# Patient Record
Sex: FEMALE | Race: BLACK OR AFRICAN AMERICAN | NOT HISPANIC OR LATINO | Employment: OTHER | URBAN - METROPOLITAN AREA
[De-identification: names, ages, dates, MRNs, and addresses within clinical notes are randomized per-mention and may not be internally consistent; named-entity substitution may affect disease eponyms.]

---

## 2017-05-16 ENCOUNTER — HISTORICAL (OUTPATIENT)
Dept: ADMINISTRATIVE | Facility: HOSPITAL | Age: 62
End: 2017-05-16

## 2020-02-04 ENCOUNTER — HISTORICAL (OUTPATIENT)
Dept: ADMINISTRATIVE | Facility: HOSPITAL | Age: 65
End: 2020-02-04

## 2020-04-14 ENCOUNTER — HISTORICAL (OUTPATIENT)
Dept: ADMINISTRATIVE | Facility: HOSPITAL | Age: 65
End: 2020-04-14

## 2022-04-11 ENCOUNTER — HISTORICAL (OUTPATIENT)
Dept: ADMINISTRATIVE | Facility: HOSPITAL | Age: 67
End: 2022-04-11

## 2022-04-29 VITALS
HEIGHT: 62 IN | SYSTOLIC BLOOD PRESSURE: 137 MMHG | DIASTOLIC BLOOD PRESSURE: 65 MMHG | BODY MASS INDEX: 33.86 KG/M2 | WEIGHT: 184 LBS

## 2022-06-23 ENCOUNTER — HOSPITAL ENCOUNTER (INPATIENT)
Facility: HOSPITAL | Age: 67
LOS: 2 days | Discharge: HOME | DRG: 689 | End: 2022-06-26
Attending: STUDENT IN AN ORGANIZED HEALTH CARE EDUCATION/TRAINING PROGRAM | Admitting: INTERNAL MEDICINE
Payer: MEDICARE

## 2022-06-23 DIAGNOSIS — R00.1 BRADYCARDIA: ICD-10-CM

## 2022-06-23 DIAGNOSIS — R41.0 CONFUSION: ICD-10-CM

## 2022-06-23 DIAGNOSIS — R44.1 VISUAL HALLUCINATION: ICD-10-CM

## 2022-06-23 DIAGNOSIS — N17.9 AKI (ACUTE KIDNEY INJURY): ICD-10-CM
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
## 2022-06-23 DIAGNOSIS — N30.00 ACUTE CYSTITIS WITHOUT HEMATURIA: Primary | ICD-10-CM

## 2022-06-23 LAB
ALBUMIN SERPL-MCNC: 3.1 GM/DL (ref 3.4–4.8)
ALBUMIN/GLOB SERPL: 0.7 RATIO (ref 1.1–2)
ALP SERPL-CCNC: 47 UNIT/L (ref 40–150)
ALT SERPL-CCNC: 10 UNIT/L (ref 0–55)
APPEARANCE UR: ABNORMAL
AST SERPL-CCNC: 17 UNIT/L (ref 5–34)
BACTERIA #/AREA URNS AUTO: ABNORMAL /HPF
BASOPHILS # BLD AUTO: 0.1 X10(3)/MCL (ref 0–0.2)
BASOPHILS NFR BLD AUTO: 0.8 %
BILIRUB UR QL STRIP.AUTO: NEGATIVE MG/DL
BILIRUBIN DIRECT+TOT PNL SERPL-MCNC: 0.2 MG/DL
BUN SERPL-MCNC: 23.9 MG/DL (ref 9.8–20.1)
CALCIUM SERPL-MCNC: 9.8 MG/DL (ref 8.4–10.2)
CHLORIDE SERPL-SCNC: 99 MMOL/L (ref 98–107)
CO2 SERPL-SCNC: 29 MMOL/L (ref 23–31)
COLOR UR AUTO: YELLOW
CREAT SERPL-MCNC: 1.69 MG/DL (ref 0.55–1.02)
EOSINOPHIL # BLD AUTO: 0.23 X10(3)/MCL (ref 0–0.9)
EOSINOPHIL NFR BLD AUTO: 1.8 %
ERYTHROCYTE [DISTWIDTH] IN BLOOD BY AUTOMATED COUNT: 16.6 % (ref 11.5–17)
ETHANOL SERPL-MCNC: <10 MG/DL
GLOBULIN SER-MCNC: 4.7 GM/DL (ref 2.4–3.5)
GLUCOSE SERPL-MCNC: 84 MG/DL (ref 82–115)
GLUCOSE UR QL STRIP.AUTO: NEGATIVE MG/DL
HCT VFR BLD AUTO: 28.9 % (ref 37–47)
HGB BLD-MCNC: 9.5 GM/DL (ref 12–16)
HYALINE CASTS URNS QL MICRO: ABNORMAL /HPF
IMM GRANULOCYTES # BLD AUTO: 0.13 X10(3)/MCL (ref 0–0.02)
IMM GRANULOCYTES NFR BLD AUTO: 1 % (ref 0–0.43)
KETONES UR QL STRIP.AUTO: NEGATIVE MG/DL
LACTATE SERPL-SCNC: 0.9 MMOL/L (ref 0.5–2.2)
LEUKOCYTE ESTERASE UR QL STRIP.AUTO: ABNORMAL UNIT/L
LYMPHOCYTES # BLD AUTO: 1.46 X10(3)/MCL (ref 0.6–4.6)
LYMPHOCYTES NFR BLD AUTO: 11.6 %
MCH RBC QN AUTO: 29 PG (ref 27–31)
MCHC RBC AUTO-ENTMCNC: 32.9 MG/DL (ref 33–36)
MCV RBC AUTO: 88.1 FL (ref 80–94)
MONOCYTES # BLD AUTO: 0.86 X10(3)/MCL (ref 0.1–1.3)
MONOCYTES NFR BLD AUTO: 6.8 %
MUCOUS THREADS URNS QL MICRO: ABNORMAL /LPF
NEUTROPHILS # BLD AUTO: 9.8 X10(3)/MCL (ref 2.1–9.2)
NEUTROPHILS NFR BLD AUTO: 78 %
NITRITE UR QL STRIP.AUTO: NEGATIVE
NRBC BLD AUTO-RTO: 0 %
PH UR STRIP.AUTO: 5.5 [PH]
PLATELET # BLD AUTO: 350 X10(3)/MCL (ref 130–400)
PMV BLD AUTO: 12.2 FL (ref 9.4–12.4)
POTASSIUM SERPL-SCNC: 3.2 MMOL/L (ref 3.5–5.1)
PROT SERPL-MCNC: 7.8 GM/DL (ref 5.8–7.6)
PROT UR QL STRIP.AUTO: NEGATIVE MG/DL
RBC # BLD AUTO: 3.28 X10(6)/MCL (ref 4.2–5.4)
RBC #/AREA URNS AUTO: ABNORMAL /HPF
RBC UR QL AUTO: NEGATIVE UNIT/L
SODIUM SERPL-SCNC: 143 MMOL/L (ref 136–145)
SP GR UR STRIP.AUTO: 1.02
SQUAMOUS #/AREA URNS AUTO: ABNORMAL /HPF
TROPONIN I SERPL-MCNC: <0.01 NG/ML (ref 0–0.04)
UROBILINOGEN UR STRIP-ACNC: 0.2 MG/DL
WBC # SPEC AUTO: 12.6 X10(3)/MCL (ref 4.5–11.5)
WBC #/AREA URNS AUTO: ABNORMAL /HPF

## 2022-06-23 PROCEDURE — 99285 EMERGENCY DEPT VISIT HI MDM: CPT | Mod: 25

## 2022-06-23 PROCEDURE — 83605 ASSAY OF LACTIC ACID: CPT | Performed by: STUDENT IN AN ORGANIZED HEALTH CARE EDUCATION/TRAINING PROGRAM

## 2022-06-23 PROCEDURE — 87040 BLOOD CULTURE FOR BACTERIA: CPT | Performed by: STUDENT IN AN ORGANIZED HEALTH CARE EDUCATION/TRAINING PROGRAM

## 2022-06-23 PROCEDURE — 93005 ELECTROCARDIOGRAM TRACING: CPT

## 2022-06-23 PROCEDURE — 82077 ASSAY SPEC XCP UR&BREATH IA: CPT | Performed by: STUDENT IN AN ORGANIZED HEALTH CARE EDUCATION/TRAINING PROGRAM

## 2022-06-23 PROCEDURE — 81001 URINALYSIS AUTO W/SCOPE: CPT | Performed by: STUDENT IN AN ORGANIZED HEALTH CARE EDUCATION/TRAINING PROGRAM

## 2022-06-23 PROCEDURE — 93010 ELECTROCARDIOGRAM REPORT: CPT | Mod: ,,, | Performed by: INTERNAL MEDICINE

## 2022-06-23 PROCEDURE — 36415 COLL VENOUS BLD VENIPUNCTURE: CPT | Performed by: STUDENT IN AN ORGANIZED HEALTH CARE EDUCATION/TRAINING PROGRAM

## 2022-06-23 PROCEDURE — 80307 DRUG TEST PRSMV CHEM ANLYZR: CPT | Performed by: STUDENT IN AN ORGANIZED HEALTH CARE EDUCATION/TRAINING PROGRAM

## 2022-06-23 PROCEDURE — 80162 ASSAY OF DIGOXIN TOTAL: CPT | Performed by: STUDENT IN AN ORGANIZED HEALTH CARE EDUCATION/TRAINING PROGRAM

## 2022-06-23 PROCEDURE — 80053 COMPREHEN METABOLIC PANEL: CPT | Performed by: STUDENT IN AN ORGANIZED HEALTH CARE EDUCATION/TRAINING PROGRAM

## 2022-06-23 PROCEDURE — 84443 ASSAY THYROID STIM HORMONE: CPT | Performed by: STUDENT IN AN ORGANIZED HEALTH CARE EDUCATION/TRAINING PROGRAM

## 2022-06-23 PROCEDURE — 87636 SARSCOV2 & INF A&B AMP PRB: CPT | Performed by: STUDENT IN AN ORGANIZED HEALTH CARE EDUCATION/TRAINING PROGRAM

## 2022-06-23 PROCEDURE — 96360 HYDRATION IV INFUSION INIT: CPT

## 2022-06-23 PROCEDURE — 85025 COMPLETE CBC W/AUTO DIFF WBC: CPT | Performed by: STUDENT IN AN ORGANIZED HEALTH CARE EDUCATION/TRAINING PROGRAM

## 2022-06-23 PROCEDURE — 93010 EKG 12-LEAD: ICD-10-PCS | Mod: ,,, | Performed by: INTERNAL MEDICINE

## 2022-06-23 PROCEDURE — 84484 ASSAY OF TROPONIN QUANT: CPT | Performed by: STUDENT IN AN ORGANIZED HEALTH CARE EDUCATION/TRAINING PROGRAM

## 2022-06-23 RX ORDER — LISINOPRIL 20 MG/1
TABLET ORAL
COMMUNITY
Start: 2022-04-01

## 2022-06-23 RX ORDER — POTASSIUM CHLORIDE 20 MEQ/15ML
SOLUTION ORAL
COMMUNITY
Start: 2022-04-01

## 2022-06-23 RX ORDER — HYDROCHLOROTHIAZIDE 25 MG/1
TABLET ORAL
Status: ON HOLD | COMMUNITY
Start: 2022-04-01 | End: 2022-07-03 | Stop reason: HOSPADM

## 2022-06-23 RX ORDER — POTASSIUM CHLORIDE 20 MEQ/1
40 TABLET, EXTENDED RELEASE ORAL
Status: COMPLETED | OUTPATIENT
Start: 2022-06-24 | End: 2022-06-24

## 2022-06-23 RX ORDER — PANTOPRAZOLE SODIUM 40 MG/1
TABLET, DELAYED RELEASE ORAL
COMMUNITY
Start: 2022-04-01

## 2022-06-23 RX ORDER — ALBUTEROL SULFATE 90 UG/1
AEROSOL, METERED RESPIRATORY (INHALATION)
COMMUNITY

## 2022-06-23 RX ORDER — LEVOTHYROXINE SODIUM 100 UG/1
100 TABLET ORAL DAILY
COMMUNITY
Start: 2022-05-27

## 2022-06-23 RX ORDER — ATORVASTATIN CALCIUM 20 MG/1
TABLET, FILM COATED ORAL
COMMUNITY

## 2022-06-23 RX ORDER — PREDNISONE 20 MG/1
TABLET ORAL
Status: ON HOLD | COMMUNITY
Start: 2022-06-06 | End: 2022-07-03 | Stop reason: HOSPADM

## 2022-06-23 RX ORDER — ISOSORBIDE MONONITRATE 30 MG/1
TABLET, EXTENDED RELEASE ORAL
COMMUNITY
Start: 2022-04-01

## 2022-06-23 RX ORDER — TORSEMIDE 20 MG/1
20 TABLET ORAL DAILY
COMMUNITY
Start: 2022-06-08

## 2022-06-23 RX ORDER — ASPIRIN 325 MG
TABLET ORAL
COMMUNITY

## 2022-06-23 RX ORDER — BOSENTAN 125 MG/1
TABLET, FILM COATED ORAL
COMMUNITY
Start: 2022-04-01

## 2022-06-23 RX ORDER — FERROUS GLUCONATE 324(38)MG
TABLET ORAL
COMMUNITY
Start: 2022-04-01

## 2022-06-23 RX ORDER — FLUTICASONE PROPIONATE 50 MCG
SPRAY, SUSPENSION (ML) NASAL
COMMUNITY
Start: 2022-04-01

## 2022-06-23 RX ORDER — DIGOXIN 250 MCG
TABLET ORAL
COMMUNITY
Start: 2022-04-01

## 2022-06-23 RX ORDER — CHOLESTYRAMINE 4 G/9G
POWDER, FOR SUSPENSION ORAL
COMMUNITY
Start: 2022-04-06

## 2022-06-23 RX ORDER — METOPROLOL SUCCINATE 25 MG/1
TABLET, EXTENDED RELEASE ORAL
Status: ON HOLD | COMMUNITY
End: 2022-07-03 | Stop reason: HOSPADM

## 2022-06-24 PROBLEM — I10 HTN (HYPERTENSION): Status: ACTIVE | Noted: 2022-06-24

## 2022-06-24 PROBLEM — N39.0 UTI (URINARY TRACT INFECTION): Status: ACTIVE | Noted: 2022-06-24

## 2022-06-24 PROBLEM — R44.3 HALLUCINATION: Status: ACTIVE | Noted: 2022-06-24

## 2022-06-24 PROBLEM — E78.5 HLD (HYPERLIPIDEMIA): Status: ACTIVE | Noted: 2022-06-24

## 2022-06-24 LAB
ALBUMIN SERPL-MCNC: 2.6 GM/DL (ref 3.4–4.8)
ALBUMIN/GLOB SERPL: 0.7 RATIO (ref 1.1–2)
ALP SERPL-CCNC: 42 UNIT/L (ref 40–150)
ALT SERPL-CCNC: 8 UNIT/L (ref 0–55)
AMPHET UR QL SCN: NEGATIVE
AST SERPL-CCNC: 15 UNIT/L (ref 5–34)
BARBITURATE SCN PRESENT UR: NEGATIVE
BASOPHILS # BLD AUTO: 0.08 X10(3)/MCL (ref 0–0.2)
BASOPHILS NFR BLD AUTO: 0.9 %
BENZODIAZ UR QL SCN: NEGATIVE
BILIRUBIN DIRECT+TOT PNL SERPL-MCNC: 0.2 MG/DL
BUN SERPL-MCNC: 21.1 MG/DL (ref 9.8–20.1)
CALCIUM SERPL-MCNC: 9.3 MG/DL (ref 8.4–10.2)
CANNABINOIDS UR QL SCN: NEGATIVE
CHLORIDE SERPL-SCNC: 104 MMOL/L (ref 98–107)
CO2 SERPL-SCNC: 30 MMOL/L (ref 23–31)
COCAINE UR QL SCN: NEGATIVE
CREAT SERPL-MCNC: 1.37 MG/DL (ref 0.55–1.02)
DIGOXIN SERPL-MCNC: 0.7 NG/ML (ref 0.8–2)
EOSINOPHIL # BLD AUTO: 0.24 X10(3)/MCL (ref 0–0.9)
EOSINOPHIL NFR BLD AUTO: 2.7 %
ERYTHROCYTE [DISTWIDTH] IN BLOOD BY AUTOMATED COUNT: 16.5 % (ref 11.5–17)
FLUAV AG UPPER RESP QL IA.RAPID: NOT DETECTED
FLUBV AG UPPER RESP QL IA.RAPID: NOT DETECTED
GLOBULIN SER-MCNC: 3.5 GM/DL (ref 2.4–3.5)
GLUCOSE SERPL-MCNC: 70 MG/DL (ref 82–115)
HCT VFR BLD AUTO: 24.8 % (ref 37–47)
HGB BLD-MCNC: 8.1 GM/DL (ref 12–16)
IMM GRANULOCYTES # BLD AUTO: 0.07 X10(3)/MCL (ref 0–0.02)
IMM GRANULOCYTES NFR BLD AUTO: 0.8 % (ref 0–0.43)
LYMPHOCYTES # BLD AUTO: 1.31 X10(3)/MCL (ref 0.6–4.6)
LYMPHOCYTES NFR BLD AUTO: 14.8 %
MCH RBC QN AUTO: 28.6 PG (ref 27–31)
MCHC RBC AUTO-ENTMCNC: 32.7 MG/DL (ref 33–36)
MCV RBC AUTO: 87.6 FL (ref 80–94)
MDMA UR QL SCN: NEGATIVE
MONOCYTES # BLD AUTO: 0.76 X10(3)/MCL (ref 0.1–1.3)
MONOCYTES NFR BLD AUTO: 8.6 %
NEUTROPHILS # BLD AUTO: 6.4 X10(3)/MCL (ref 2.1–9.2)
NEUTROPHILS NFR BLD AUTO: 72.2 %
NRBC BLD AUTO-RTO: 0 %
OPIATES UR QL SCN: NEGATIVE
PCP UR QL: NEGATIVE
PH UR: 5.5 [PH] (ref 3–11)
PLATELET # BLD AUTO: 283 X10(3)/MCL (ref 130–400)
PMV BLD AUTO: 11.6 FL (ref 9.4–12.4)
POTASSIUM SERPL-SCNC: 4 MMOL/L (ref 3.5–5.1)
PROT SERPL-MCNC: 6.1 GM/DL (ref 5.8–7.6)
RBC # BLD AUTO: 2.83 X10(6)/MCL (ref 4.2–5.4)
SARS-COV-2 RNA RESP QL NAA+PROBE: NOT DETECTED
SODIUM SERPL-SCNC: 144 MMOL/L (ref 136–145)
SPECIFIC GRAVITY, URINE AUTO (.000) (OHS): 1.02 (ref 1–1.03)
TSH SERPL-ACNC: 1.77 UIU/ML (ref 0.35–4.94)
WBC # SPEC AUTO: 8.8 X10(3)/MCL (ref 4.5–11.5)

## 2022-06-24 PROCEDURE — 80053 COMPREHEN METABOLIC PANEL: CPT | Performed by: STUDENT IN AN ORGANIZED HEALTH CARE EDUCATION/TRAINING PROGRAM

## 2022-06-24 PROCEDURE — 25000242 PHARM REV CODE 250 ALT 637 W/ HCPCS: Performed by: INTERNAL MEDICINE

## 2022-06-24 PROCEDURE — 36415 COLL VENOUS BLD VENIPUNCTURE: CPT | Performed by: STUDENT IN AN ORGANIZED HEALTH CARE EDUCATION/TRAINING PROGRAM

## 2022-06-24 PROCEDURE — 63600175 PHARM REV CODE 636 W HCPCS: Performed by: INTERNAL MEDICINE

## 2022-06-24 PROCEDURE — 25000003 PHARM REV CODE 250: Performed by: STUDENT IN AN ORGANIZED HEALTH CARE EDUCATION/TRAINING PROGRAM

## 2022-06-24 PROCEDURE — 63600175 PHARM REV CODE 636 W HCPCS: Performed by: STUDENT IN AN ORGANIZED HEALTH CARE EDUCATION/TRAINING PROGRAM

## 2022-06-24 PROCEDURE — 85025 COMPLETE CBC W/AUTO DIFF WBC: CPT | Performed by: STUDENT IN AN ORGANIZED HEALTH CARE EDUCATION/TRAINING PROGRAM

## 2022-06-24 PROCEDURE — 11000001 HC ACUTE MED/SURG PRIVATE ROOM

## 2022-06-24 PROCEDURE — 25000003 PHARM REV CODE 250: Performed by: INTERNAL MEDICINE

## 2022-06-24 RX ORDER — SODIUM CHLORIDE 0.9 % (FLUSH) 0.9 %
10 SYRINGE (ML) INJECTION
Status: DISCONTINUED | OUTPATIENT
Start: 2022-06-24 | End: 2022-06-26 | Stop reason: HOSPADM

## 2022-06-24 RX ORDER — METOPROLOL SUCCINATE 25 MG/1
25 TABLET, EXTENDED RELEASE ORAL DAILY
Status: DISCONTINUED | OUTPATIENT
Start: 2022-06-24 | End: 2022-06-25

## 2022-06-24 RX ORDER — ISOSORBIDE MONONITRATE 30 MG/1
30 TABLET, EXTENDED RELEASE ORAL DAILY
Status: DISCONTINUED | OUTPATIENT
Start: 2022-06-24 | End: 2022-06-26 | Stop reason: HOSPADM

## 2022-06-24 RX ORDER — ALBUTEROL SULFATE 90 UG/1
2 AEROSOL, METERED RESPIRATORY (INHALATION) EVERY 6 HOURS PRN
Status: DISCONTINUED | OUTPATIENT
Start: 2022-06-24 | End: 2022-06-26 | Stop reason: HOSPADM

## 2022-06-24 RX ORDER — SODIUM CHLORIDE, SODIUM LACTATE, POTASSIUM CHLORIDE, CALCIUM CHLORIDE 600; 310; 30; 20 MG/100ML; MG/100ML; MG/100ML; MG/100ML
INJECTION, SOLUTION INTRAVENOUS CONTINUOUS
Status: DISCONTINUED | OUTPATIENT
Start: 2022-06-24 | End: 2022-06-25

## 2022-06-24 RX ORDER — ENOXAPARIN SODIUM 100 MG/ML
40 INJECTION SUBCUTANEOUS EVERY 24 HOURS
Status: DISCONTINUED | OUTPATIENT
Start: 2022-06-24 | End: 2022-06-26 | Stop reason: HOSPADM

## 2022-06-24 RX ORDER — FLUTICASONE PROPIONATE 50 MCG
2 SPRAY, SUSPENSION (ML) NASAL 2 TIMES DAILY
Status: DISCONTINUED | OUTPATIENT
Start: 2022-06-24 | End: 2022-06-26 | Stop reason: HOSPADM

## 2022-06-24 RX ORDER — LEVOTHYROXINE SODIUM 100 UG/1
100 TABLET ORAL DAILY
Status: DISCONTINUED | OUTPATIENT
Start: 2022-06-24 | End: 2022-06-26 | Stop reason: HOSPADM

## 2022-06-24 RX ORDER — LANOLIN ALCOHOL/MO/W.PET/CERES
1 CREAM (GRAM) TOPICAL DAILY
Status: DISCONTINUED | OUTPATIENT
Start: 2022-06-24 | End: 2022-06-26 | Stop reason: HOSPADM

## 2022-06-24 RX ORDER — ATORVASTATIN CALCIUM 10 MG/1
20 TABLET, FILM COATED ORAL NIGHTLY
Status: DISCONTINUED | OUTPATIENT
Start: 2022-06-24 | End: 2022-06-26 | Stop reason: HOSPADM

## 2022-06-24 RX ORDER — LISINOPRIL 20 MG/1
20 TABLET ORAL DAILY
Status: DISCONTINUED | OUTPATIENT
Start: 2022-06-24 | End: 2022-06-26 | Stop reason: HOSPADM

## 2022-06-24 RX ORDER — ASPIRIN 325 MG
325 TABLET ORAL DAILY
Status: DISCONTINUED | OUTPATIENT
Start: 2022-06-24 | End: 2022-06-26 | Stop reason: HOSPADM

## 2022-06-24 RX ORDER — ACETAMINOPHEN 325 MG/1
650 TABLET ORAL EVERY 6 HOURS PRN
Status: DISCONTINUED | OUTPATIENT
Start: 2022-06-25 | End: 2022-06-26 | Stop reason: HOSPADM

## 2022-06-24 RX ORDER — DIGOXIN 125 MCG
0.25 TABLET ORAL DAILY
Status: DISCONTINUED | OUTPATIENT
Start: 2022-06-24 | End: 2022-06-26 | Stop reason: HOSPADM

## 2022-06-24 RX ORDER — PANTOPRAZOLE SODIUM 40 MG/1
40 TABLET, DELAYED RELEASE ORAL DAILY
Status: DISCONTINUED | OUTPATIENT
Start: 2022-06-24 | End: 2022-06-26 | Stop reason: HOSPADM

## 2022-06-24 RX ORDER — CHOLESTYRAMINE 4 G/9G
1 POWDER, FOR SUSPENSION ORAL DAILY
Status: DISCONTINUED | OUTPATIENT
Start: 2022-06-24 | End: 2022-06-26 | Stop reason: HOSPADM

## 2022-06-24 RX ADMIN — ENOXAPARIN SODIUM 40 MG: 40 INJECTION SUBCUTANEOUS at 04:06

## 2022-06-24 RX ADMIN — SODIUM CHLORIDE, POTASSIUM CHLORIDE, SODIUM LACTATE AND CALCIUM CHLORIDE: 600; 310; 30; 20 INJECTION, SOLUTION INTRAVENOUS at 04:06

## 2022-06-24 RX ADMIN — FLUTICASONE PROPIONATE 100 MCG: 50 SPRAY, METERED NASAL at 09:06

## 2022-06-24 RX ADMIN — FERROUS SULFATE TAB 325 MG (65 MG ELEMENTAL FE) 1 EACH: 325 (65 FE) TAB at 12:06

## 2022-06-24 RX ADMIN — ATORVASTATIN CALCIUM 20 MG: 10 TABLET, FILM COATED ORAL at 09:06

## 2022-06-24 RX ADMIN — POTASSIUM CHLORIDE 40 MEQ: 20 TABLET, EXTENDED RELEASE ORAL at 12:06

## 2022-06-24 RX ADMIN — CEFTRIAXONE SODIUM 1 G: 1 INJECTION, POWDER, FOR SOLUTION INTRAMUSCULAR; INTRAVENOUS at 01:06

## 2022-06-24 RX ADMIN — METOPROLOL SUCCINATE 25 MG: 25 TABLET, FILM COATED, EXTENDED RELEASE ORAL at 12:06

## 2022-06-24 RX ADMIN — LISINOPRIL 20 MG: 20 TABLET ORAL at 12:06

## 2022-06-24 RX ADMIN — ISOSORBIDE MONONITRATE 30 MG: 30 TABLET, EXTENDED RELEASE ORAL at 04:06

## 2022-06-24 RX ADMIN — CHOLESTYRAMINE 4 G: 4 POWDER, FOR SUSPENSION ORAL at 04:06

## 2022-06-24 RX ADMIN — ASPIRIN 325 MG ORAL TABLET 325 MG: 325 PILL ORAL at 12:06

## 2022-06-24 RX ADMIN — CEFTRIAXONE SODIUM 1 G: 1 INJECTION, POWDER, FOR SOLUTION INTRAMUSCULAR; INTRAVENOUS at 12:06

## 2022-06-24 RX ADMIN — SODIUM CHLORIDE, POTASSIUM CHLORIDE, SODIUM LACTATE AND CALCIUM CHLORIDE: 600; 310; 30; 20 INJECTION, SOLUTION INTRAVENOUS at 01:06

## 2022-06-24 RX ADMIN — PANTOPRAZOLE SODIUM 40 MG: 40 TABLET, DELAYED RELEASE ORAL at 12:06

## 2022-06-24 RX ADMIN — DIGOXIN 0.25 MG: 125 TABLET ORAL at 12:06

## 2022-06-24 RX ADMIN — SODIUM CHLORIDE, POTASSIUM CHLORIDE, SODIUM LACTATE AND CALCIUM CHLORIDE 1000 ML: 600; 310; 30; 20 INJECTION, SOLUTION INTRAVENOUS at 12:06

## 2022-06-24 NOTE — ED PROVIDER NOTES
Encounter Date: 2022       History     Chief Complaint   Patient presents with    Fall     Fell times 2 -has weakness and hallucinating -pt says that she saw a flower pot move to an aquarium and saw grand baby [id number] realizes that this is not correct     The history is provided by the patient and a relative.   This is a 67-year-old female with a past medical history of CAD status post CABG, hypertension, hyperlipidemia and hypothyroidism who presents emergency department for increased confusion, weakness his hallucinations.  Patient states that over the last 2-3 weeks she noticed that she was seeing things that were impossible.  Patient states that she noticed that she would see flower pots moving towards an a query.  She notes that she would see  baby walking.  She also states that she sees people riding bicycles in the air.  She notes that all these things are not real although she cannot stop seen them.  She also sees family members who have been dead.  She denies having any headache.  No shortness of breath or chest pain.  No new medications.  She did state that she started gabapentin approximately 3 weeks ago although after 3 doses stopped the medication because they thought that that may have been the cause of the hallucinations although she has not been on this medication for the last few weeks.  She denies any drug use or alcohol use.  Patient did sustain a fall approximately 1 week ago and states she did hit her head.   Review of patient's allergies indicates:  No Known Allergies  Past Medical History:   Diagnosis Date    Coronary artery disease     Hypertension     Thyroid disease      Past Surgical History:   Procedure Laterality Date    CORONARY ARTERY BYPASS GRAFT (CABG)       No family history on file.     Review of Systems   Constitutional: Positive for fatigue. Negative for fever.   Respiratory: Negative for cough and shortness of breath.    Cardiovascular: Negative for  chest pain and leg swelling.   Gastrointestinal: Negative for abdominal pain.   Genitourinary: Negative for dysuria.   Musculoskeletal: Negative for gait problem.   Psychiatric/Behavioral: Positive for confusion and hallucinations. Negative for agitation, behavioral problems, self-injury, sleep disturbance and suicidal ideas. The patient is not nervous/anxious and is not hyperactive.    All other systems reviewed and are negative.      Physical Exam     Initial Vitals [06/23/22 2222]   BP Pulse Resp Temp SpO2   (!) 141/78 94 20 98.4 °F (36.9 °C) 100 %      MAP       --         Physical Exam    Nursing note and vitals reviewed.  Constitutional: She appears well-developed and well-nourished. No distress.   Eyes: EOM are normal. Pupils are equal, round, and reactive to light.   Cardiovascular: Normal rate and regular rhythm.   Pulmonary/Chest: Breath sounds normal. No respiratory distress.   Abdominal: Abdomen is soft. Bowel sounds are normal.   Musculoskeletal:         General: No tenderness. Normal range of motion.     Neurological: She is alert and oriented to person, place, and time. She has normal strength and normal reflexes. She displays normal reflexes. No cranial nerve deficit or sensory deficit. She displays a negative Romberg sign. Coordination and gait normal. GCS score is 15. GCS eye subscore is 4. GCS verbal subscore is 5. GCS motor subscore is 6.   Normal heel to shin    Skin: Skin is warm. Capillary refill takes less than 2 seconds.   Psychiatric: She has a normal mood and affect. Thought content normal.         ED Course   Procedures  Labs Reviewed   COMPREHENSIVE METABOLIC PANEL - Abnormal; Notable for the following components:       Result Value    Potassium Level 3.2 (*)     Blood Urea Nitrogen 23.9 (*)     Creatinine 1.69 (*)     Protein Total 7.8 (*)     Albumin Level 3.1 (*)     Globulin 4.7 (*)     Albumin/Globulin Ratio 0.7 (*)     All other components within normal limits   URINALYSIS, REFLEX  TO URINE CULTURE - Abnormal; Notable for the following components:    Appearance, UA Hazy (*)     Leukocyte Esterase, UA Trace (*)     All other components within normal limits   CBC WITH DIFFERENTIAL - Abnormal; Notable for the following components:    WBC 12.6 (*)     RBC 3.28 (*)     Hgb 9.5 (*)     Hct 28.9 (*)     MCHC 32.9 (*)     Neut # 9.8 (*)     IG# 0.13 (*)     IG% 1.0 (*)     All other components within normal limits   URINALYSIS, MICROSCOPIC - Abnormal; Notable for the following components:    Bacteria, UA Few (*)     Hyaline Casts, UA Few (*)     Mucous, UA Moderate (*)     WBC, UA 11-20 (*)     Squamous Epithelial Cells, UA Few (*)     All other components within normal limits   COVID/FLU A&B PCR - Normal   LACTIC ACID, PLASMA - Normal   TSH - Normal   TROPONIN I - Normal   ALCOHOL,MEDICAL (ETHANOL) - Normal   CULTURE, URINE   BLOOD CULTURE [facility]   BLOOD CULTURE [facility]   CBC W/ AUTO DIFFERENTIAL    Narrative:     The following orders were created for panel order CBC auto differential.  Procedure                               Abnormality         Status                     ---------                               -----------         ------                     CBC with Differential[[id number]]        Abnormal            Final result                 Please view results for these tests on the individual orders.   DRUG SCREEN, URINE (BEAKER)   DIGOXIN LEVEL   CBC W/ AUTO DIFFERENTIAL    Narrative:     The following orders were created for panel order CBC auto differential.  Procedure                               Abnormality         Status                     ---------                               -----------         ------                     CBC with Differential[[id number]]                                                         Please view results for these tests on the individual orders.   COMPREHENSIVE METABOLIC PANEL   CBC WITH DIFFERENTIAL     EKG Readings: (Independently Interpreted)   Initial Reading: No  STEMI. Rhythm: Normal Sinus Rhythm. Heart Rate: 90. Ectopy: No Ectopy. Conduction: Normal. ST Segments: Non-Specific ST Segment Depression. T Waves: Normal. Clinical Impression: Normal Sinus Rhythm and AV Block - 1st Degree       Imaging Results          CT Cervical Spine Without Contrast (Preliminary result)  Result time 06/23/22 22:41:29    Preliminary result by Hans Alexander Jr., MD (06/23/22 22:41:29)                 Narrative:    START OF REPORT:  Technique: CT of the cervical spine was performed without intravenous contrast with axial as well as sagittal and coronal images.    Comparison: None.    Dosage Information: Automated exposure control was utilized.    Clinical history: Neck pain/stiffness from multiple falls.    Findings:  Lung apices: The visualized lung apices appear unremarkable.  Spine:  Spinal canal: Mild canal stenosis is seen at C3-C4 and C4-C5 secondary to disc herniation.  Spinal cord: The spinal cord appears unremarkable.  Mineralization: Mild osteopenia is seen in the visualized bony structures.  Scoliosis: No significant scoliosis is seen.  Vertebral Fusion: No vertebral fusion is identified.  Listhesis: There is grade I degenerative anterolisthesis of C3 on C4. There is grade I degenerative posterolisthesis C4 on C5.  Lordosis: Reversal of the normal cervical lordosis is seen. The reversal is centered on C3-C4. This may reflect an element of myospasm.  Intervertebral disc spaces: Mildly decreased disc height is seen at C3-C4. Severely decreased disc height is seen at C4-C5.  Osteophytes: No significant osteophytes are seen in the cervical spine. Mild anterior multilevel endplate osteophytes are seen. Small posterior osteophytes are seen off the opposing endplates at C3-C4 and C4-C5.  Endplate Sclerosis: No significant endplate sclerosis is seen. Subtle endplate sclerosis is seen off the opposing endplates at C3-C4 and C4-C5.  Uncovertebral degenerative changes: Mild-to-moderate  uncovertebral joint degenerative changes are seen at 34 and C4-C5.  Facet degenerative changes: Moderate to severe multilevel facet degenerative changes are seen.  Calcifications: None.  Fractures: No acute fracture dislocation or acute tramuatic subluxation is seen.      Impression:  1. No acute fracture dislocation or acute tramuatic subluxation is seen.  2. Degenerative changes and other details as above. Details as noted above.                                 CT Head Without Contrast (Preliminary result)  Result time 06/23/22 22:33:16    Preliminary result by Hans Alexander Jr., MD (06/23/22 22:33:16)                 Narrative:    START OF REPORT:  Technique: CT of the head was performed without intravenous contrast with axial as well as coronal and sagittal images.    Comparison: None.    Dosage Information: Automated exposure control was utilized.    Clinical history: Ams, multiple falls past couple days, hallucinations.    Findings:  Hemorrhage: No acute intracranial hemorrhage is seen.  CSF spaces: The ventricles, sulci and basal cisterns all appear moderately prominent consistent with global cerebral atrophy.  Brain parenchyma: There is preservation of the grey white junction throughout. Mild microvascular change is seen in portions of the periventricular and deep white matter tracts.  Cerebellum: Unremarkable.  Sella and skull base: The sella appears to be within normal limits for age.  Herniation: None.  Intracranial calcifications: Incidental note is made of bilateral choroid plexus calcification.  Calvarium: No acute linear or depressed skull fracture is seen.    Maxillofacial Structures:  Paranasal sinuses: There is some mucoperiosteal thickening in the right maxillary sinus.  Orbits: The orbits appear unremarkable.  Zygomatic arches: The zygomatic arches are intact and unremarkable.  Temporal bones and mastoids: The temporal bones and mastoids appear unremarkable.  TMJ: The mandibular condyles  appear normally placed with respect to the mandibular fossa.      Impression:  1. No acute intracranial traumatic injury identified. Details as above.                                   Medications   cefTRIAXone (ROCEPHIN) 1 g in dextrose 5 % in water (D5W) 5 % 50 mL IVPB (MB+) (has no administration in time range)   sodium chloride 0.9% flush 10 mL (has no administration in time range)   lactated ringers infusion (has no administration in time range)   potassium chloride SA CR tablet 40 mEq (40 mEq Oral Given 6/24/22 0003)   lactated ringers bolus 1,000 mL (1,000 mLs Intravenous New Bag 6/24/22 0003)     Medical Decision Making:   Differential Diagnosis:   Urinary tract infection, dementia, electrolyte abnormality, drug abuse, Wernicke's encephalopathy, CVA, digoxin toxicity             ED Course as of 06/24/22 0122 Fri Jun 24, 2022 0019 With patient's weakness, mild LUDMILA as well as hallucinations in the setting urinary tract infection patient is it inpatient therapy with continued IV antibiotics in a.m..  Digoxin level is pending.  Will call hospitalist. [BS]   0118 Hospitalist agrees with admission. [BS]      ED Course User Index  [BS] Vinod Carl MD             Clinical Impression:   Final diagnoses:  [W19.XXXA] Fall  [N17.9] LUDMILA (acute kidney injury)  [N30.00] Acute cystitis without hematuria (Primary)  [R44.1] Visual hallucination  [R41.0] Confusion          ED Disposition Condition    Admit               Vinod Carl MD  06/24/22 0122    67 years old female history of a CABG presented to emergency room by family secondary to confusion and hallucination for 2-3 weeks after taking Neurontin    Lab work shows WBC 12.6, hemoglobin 9.5, creatinine 1.69, urine drug screen was negative, urinary shows acute UTI    When I saw the patient at MRI place, patient was alert and oriented, lungs bilateral clear to auscultation, heart regular sinus, extremity no edema    We will continue IV Rocephin an ovoid  sedative    We will check a brain MRI result and possibly going home tomorrow    amarilis mayen MD June 24, 2022  6:00 p.m.

## 2022-06-24 NOTE — H&P
[facility] EMERGENCY DEPARTMENT    Hospital Medicine History & Physical Examination       Patient Name: Lila Carmona  MRN: [id number]  Patient Class: IP- Inpatient   Admission Date: 2022   Admitting Physician: TYLOR Service   Length of Stay: 0  Attending Physician: Rico Ramos MD  Primary Care Provider: Delma Washburn MD  Face-to-Face encounter date: 2022    Code Status: Full Code    Chief Complaint: Fall (Fell times 2 -has weakness and hallucinating -pt says that she saw a flower pot move to an aquarium and saw grand baby [id number] realizes that this is not correct)          HISTORY OF PRESENT ILLNESS:   Lila Carmona is a 67 y.o. female who  has a past medical history of Coronary artery disease, Hypertension, and Thyroid disease.. The patient presented to [facility] on 2022 with a primary complaint of confusion and hallucinations.  Pt. Sustained a fall 2-3 weeks ago and was seen by md started on gabapentin but stopped after 3d because started having hallucinations.  Confusion hallucinations have persisted and she sustained another fall 1week ago in kitchen.  She has become more difficult to care for requiring 24h care, poor appetite/intake.  Upon my eval pt answers questions appropriately but daughter says she still occasionally hallucinates thinking a baby was crawling on the floor.  Otherwise workup shows mild arnaldo, she complained of neck pain from fall-ct neck and head no acute issues.  MRI  Head has been ordered.    PAST MEDICAL HISTORY:     Past Medical History:   Diagnosis Date    Coronary artery disease     Hypertension     Thyroid disease        PAST SURGICAL HISTORY:     Past Surgical History:   Procedure Laterality Date    CORONARY ARTERY BYPASS GRAFT (CABG)         ALLERGIES:   Patient has no known allergies.    FAMILY HISTORY:   family history is not on file.    SOCIAL HISTORY:     Social History     Tobacco Use    Smoking status: Not on  file    Smokeless tobacco: Not on file   Substance Use Topics    Alcohol use: Not on file        HOME MEDICATIONS:     Prior to Admission medications    Medication Sig Start Date End Date Taking? Authorizing Provider   bosentan (TRACLEER) 125 MG Tab 1 Tablet 4/1/22  Yes Historical Provider   cholestyramine, with sugar, 4 gram Powd 1 scoop 4/6/22  Yes Historical Provider   digoxin (LANOXIN) 250 mcg tablet 1 tablet 4/1/22  Yes Historical Provider   ferrous gluconate (FERGON) 324 MG tablet   TAKE 1 TABLET BY MOUTH EVERY DAY 4/1/22  Yes Historical Provider   fluticasone propionate (FLONASE) 50 mcg/actuation nasal spray by Nasal route. 4/1/22  Yes Historical Provider   hydroCHLOROthiazide (HYDRODIURIL) 25 MG tablet 1 tablet 4/1/22  Yes Historical Provider   isosorbide mononitrate (IMDUR) 30 MG 24 hr tablet 1/2 tablet 4/1/22  Yes Historical Provider   lisinopriL (PRINIVIL,ZESTRIL) 20 MG tablet 1 Tablet 4/1/22  Yes Historical Provider   pantoprazole (PROTONIX) 40 MG tablet 1 tablet 4/1/22  Yes Historical Provider   potassium chloride 10% (KAYCIEL) 20 mEq/15 mL oral solution   TAKE 15 ML BY MOUTH DAILY 4/1/22  Yes Historical Provider   albuterol (PROVENTIL/VENTOLIN HFA) 90 mcg/actuation inhaler 1 puff as needed    Historical Provider   aspirin 325 MG tablet 1 tablet    Historical Provider   atorvastatin (LIPITOR) 20 MG tablet 1 tablet    Historical Provider   levothyroxine (SYNTHROID) 100 MCG tablet Take 100 mcg by mouth once daily. 5/27/22   Historical Provider   metoprolol succinate (TOPROL-XL) 25 MG 24 hr tablet 1/2 tablet    Historical Provider   predniSONE (DELTASONE) 20 MG tablet Take by mouth. 6/6/22   Historical Provider   torsemide (DEMADEX) 20 MG Tab Take 20 mg by mouth once daily. 6/8/22   Historical Provider       REVIEW OF SYSTEMS:   Except as documented, all other systems reviewed and negative   ROS      PHYSICAL EXAM:     VITAL SIGNS: 24 HRS MIN & MAX LAST   Temp  Min: 98.4 °F (36.9 °C)  Max: 98.6 °F (37  °C) 98.6 °F (37 °C)   BP  Min: 118/63  Max: 158/78 118/63   Pulse  Min: 66  Max: 94  78   Resp  Min: 20  Max: 20 20   SpO2  Min: 94 %  Max: 100 % 100 %       General appearance: Well-developed, well-nourished female in no apparent distress.  HENT: Atraumatic head. Moist mucous membranes of oral cavity.  Eyes: Normal extraocular movements.   Neck: Supple.   Lungs: Clear to auscultation bilaterally. No wheezing present.   Heart: Regular rate and rhythm. S1 and S2 present with no murmurs/gallop/rub. No pedal edema. No JVD present.   Abdomen: Soft, non-distended, non-tender. No rebound tenderness/guarding. Bowel sounds are normal.   Extremities: No cyanosis, clubbing, or edema.  Skin: No Rash.   Neuro: Motor and sensory exams grossly intact. No focal deficits  Psych/mental status: Appropriate mood and affect. Responds appropriately to questions.     LABS AND IMAGING:     Recent Labs   Lab 06/23/22 2313 06/24/22  0552   WBC 12.6* 8.8   RBC 3.28* 2.83*   HGB 9.5* 8.1*   HCT 28.9* 24.8*   MCV 88.1 87.6   MCH 29.0 28.6   MCHC 32.9* 32.7*   RDW 16.6 16.5    283   MPV 12.2 11.6       Recent Labs   Lab 06/23/22 2313 06/24/22  0552    144   K 3.2* 4.0   CO2 29 30   BUN 23.9* 21.1*   CREATININE 1.69* 1.37*   CALCIUM 9.8 9.3   ALBUMIN 3.1* 2.6*   ALKPHOS 47 42   ALT 10 8   AST 17 15   BILITOT 0.2 0.2       Microbiology Results (last 7 days)     Procedure Component Value Units Date/Time    Blood culture #2 **CANNOT BE ORDERED STAT** [[id number]] Collected: 06/23/22 2358    Order Status: Resulted Specimen: Blood from Arm, Left Updated: 06/24/22 0045    Blood culture #1 **CANNOT BE ORDERED STAT** [[id number]] Collected: 06/23/22 2358    Order Status: Resulted Specimen: Blood from Arm, Right Updated: 06/24/22 0044    Urine culture [[id number]] Collected: 06/23/22 2313    Order Status: Sent Specimen: Urine Updated: 06/23/22 2336           CT Cervical Spine Without Contrast  Narrative: Technique:CT of the cervical spine was  performed without intravenous contrast with axial as well as sagittal and coronal images.    Comparison:None.    Dosage Information:Automated exposure control was utilized.      Clinical history:Neck pain/stiffness from multiple falls.    Findings:    Lung apices:The visualized lung apices appear unremarkable.    Spine:    Spinal canal:Mild canal stenosis is seen at C3-C4 and C4-C5 secondary to disc herniation.    Spinal cord:The spinal cord appears unremarkable.    Mineralization:Mild osteopenia is seen in the visualized bony structures.    Scoliosis:No significant scoliosis is seen.    Vertebral Fusion:No vertebral fusion is identified.    Listhesis:There is grade I degenerative anterolisthesis of C3 on C4. There is grade I degenerative posterolisthesis C4 on C5.    Lordosis:Reversal of the normal cervical lordosis is seen. The reversal is centered on C3-C4. This may reflect an element of myospasm.    Intervertebral disc spaces:Mildly decreased disc height is seen at C3-C4. Severely decreased disc height is seen at C4-C5.    Osteophytes:No significant osteophytes are seen in the cervical spine. Mild anterior multilevel endplate osteophytes are seen. Small posterior osteophytes are seen off the opposing endplates at C3-C4 and C4-C5.    Endplate Sclerosis:No significant endplate sclerosis is seen. Subtle endplate sclerosis is seen off the opposing endplates at C3-C4 and C4-C5.    Uncovertebral degenerative changes:Mild-to-moderate uncovertebral joint degenerative changes are seen at 34 and C4-C5.    Facet degenerative changes:Moderate to severe multilevel facet degenerative changes are seen.    Calcifications:None.    Fractures:No acute fracture dislocation or acute tramuatic subluxation is seen.    This study does not exclude the possibility of intrathecal soft tissue, ligamentous or vascular injury  Impression: Impression:    1. No acute fracture dislocation or acute tramuatic subluxation is seen.    2.  Degenerative changes and other details as above. Details as noted above.    No significant discrepancy with overnight report.    Electronically signed by: Adam Car  Date:    06/24/2022  Time:    07:54  CT Head Without Contrast  Narrative: Technique:CT of the head was performed without intravenous contrast with axial as well as coronal and sagittal images.    Comparison:None.    Dosage Information:Automated exposure control was utilized.      Clinical history:Ams, multiple falls past couple days, hallucinations.    Findings:    Hemorrhage:No acute intracranial hemorrhage is seen.    CSF spaces:The ventricles, sulci and basal cisterns all appear moderately prominent consistent with global cerebral atrophy.    Brain parenchyma:There is preservation of the grey white junction throughout. Mild microvascular change is seen in portions of the periventricular and deep white matter tracts.    Cerebellum:Unremarkable.    Sella and skull base:The sella appears to be within normal limits for age.    Herniation:None.    Calvarium:No acute linear or depressed skull fracture is seen.    Maxillofacial Structures:    Paranasal sinuses:There is some mucoperiosteal thickening in the right maxillary sinus.  Impression: Impression:    No acute intracranial traumatic injury identified. Details as above.    No significant discrepancy with overnight report.    Electronically signed by: Adam Car  Date:    06/24/2022  Time:    07:48        __________________________________________________________________________  INPATIENT LIST OF MEDICATIONS     Scheduled Meds:   aspirin  325 mg Oral Daily    atorvastatin  20 mg Oral QHS    cefTRIAXone (ROCEPHIN) IVPB  1 g Intravenous Q12H    cholestyramine  1 packet Oral Daily    digoxin  0.25 mg Oral Daily    ferrous sulfate  1 tablet Oral Daily    fluticasone propionate  2 spray Each Nostril BID    isosorbide mononitrate  30 mg Oral Daily    levothyroxine  100 mcg Oral Daily     lisinopriL  20 mg Oral Daily    metoprolol succinate  25 mg Oral Daily    pantoprazole  40 mg Oral Daily     Continuous Infusions:   lactated ringers 100 mL/hr at 06/24/22 0159     PRN Meds:albuterol, sodium chloride 0.9%          ASSESSMENT & PLAN:   UTI  Confusion/Hallucinations  Fall  HTN  HLD  CAD  Hypothyroid  Dementia    Plan    Resume home meds   Hold diuretic/labs in am  ivf  Rocephin/fu cxs    Gi prophylaxis: protonix  DVT prophylaxis: loveshawna Ramos MD   06/24/2022

## 2022-06-25 LAB
ABO + RH BLD: NORMAL
ABO + RH BLD: NORMAL
ABORH RETYPE: NORMAL
AMMONIA PLAS-MSCNC: <13.5 UMOL/L (ref 18–72)
ANION GAP SERPL CALC-SCNC: 8 MEQ/L
BASOPHILS # BLD AUTO: 0.06 X10(3)/MCL (ref 0–0.2)
BASOPHILS NFR BLD AUTO: 1 %
BLD PROD TYP BPU: NORMAL
BLD PROD TYP BPU: NORMAL
BLOOD UNIT EXPIRATION DATE: NORMAL
BLOOD UNIT EXPIRATION DATE: NORMAL
BLOOD UNIT TYPE CODE: 1700
BLOOD UNIT TYPE CODE: 1700
BUN SERPL-MCNC: 13 MG/DL (ref 9.8–20.1)
CALCIUM SERPL-MCNC: 8.4 MG/DL (ref 8.4–10.2)
CHLORIDE SERPL-SCNC: 107 MMOL/L (ref 98–107)
CO2 SERPL-SCNC: 27 MMOL/L (ref 23–31)
CREAT SERPL-MCNC: 1.14 MG/DL (ref 0.55–1.02)
CREAT/UREA NIT SERPL: 11
CROSSMATCH INTERPRETATION: NORMAL
CROSSMATCH INTERPRETATION: NORMAL
DISPENSE STATUS: NORMAL
DISPENSE STATUS: NORMAL
EOSINOPHIL # BLD AUTO: 0.24 X10(3)/MCL (ref 0–0.9)
EOSINOPHIL NFR BLD AUTO: 4.1 %
ERYTHROCYTE [DISTWIDTH] IN BLOOD BY AUTOMATED COUNT: 16.9 % (ref 11.5–17)
FOLATE SERPL-MCNC: 3.2 NG/ML (ref 7–31.4)
GLUCOSE SERPL-MCNC: 58 MG/DL (ref 82–115)
GROUP & RH: NORMAL
HCT VFR BLD AUTO: 21.8 % (ref 37–47)
HGB BLD-MCNC: 6.6 GM/DL (ref 12–16)
IMM GRANULOCYTES # BLD AUTO: 0.05 X10(3)/MCL (ref 0–0.02)
IMM GRANULOCYTES NFR BLD AUTO: 0.9 % (ref 0–0.43)
INDIRECT COOMBS GEL: NORMAL
IRON SATN MFR SERPL: 12 % (ref 20–50)
IRON SERPL-MCNC: 19 UG/DL (ref 50–170)
LYMPHOCYTES # BLD AUTO: 1.22 X10(3)/MCL (ref 0.6–4.6)
LYMPHOCYTES NFR BLD AUTO: 20.9 %
MCH RBC QN AUTO: 27.6 PG (ref 27–31)
MCHC RBC AUTO-ENTMCNC: 30.3 MG/DL (ref 33–36)
MCV RBC AUTO: 91.2 FL (ref 80–94)
MONOCYTES # BLD AUTO: 0.57 X10(3)/MCL (ref 0.1–1.3)
MONOCYTES NFR BLD AUTO: 9.7 %
NEUTROPHILS # BLD AUTO: 3.7 X10(3)/MCL (ref 2.1–9.2)
NEUTROPHILS NFR BLD AUTO: 63.4 %
NRBC BLD AUTO-RTO: 0 %
PLATELET # BLD AUTO: 276 X10(3)/MCL (ref 130–400)
PMV BLD AUTO: 12.7 FL (ref 9.4–12.4)
POTASSIUM SERPL-SCNC: 3.6 MMOL/L (ref 3.5–5.1)
RBC # BLD AUTO: 2.39 X10(6)/MCL (ref 4.2–5.4)
SODIUM SERPL-SCNC: 142 MMOL/L (ref 136–145)
TIBC SERPL-MCNC: 136 UG/DL (ref 70–310)
TIBC SERPL-MCNC: 155 UG/DL (ref 250–450)
TRANSFERRIN SERPL-MCNC: 147 MG/DL (ref 173–360)
UNIT NUMBER: NORMAL
UNIT NUMBER: NORMAL
VIT B12 SERPL-MCNC: 251 PG/ML (ref 213–816)
WBC # SPEC AUTO: 5.9 X10(3)/MCL (ref 4.5–11.5)

## 2022-06-25 PROCEDURE — 36415 COLL VENOUS BLD VENIPUNCTURE: CPT | Performed by: INTERNAL MEDICINE

## 2022-06-25 PROCEDURE — 86850 RBC ANTIBODY SCREEN: CPT | Performed by: INTERNAL MEDICINE

## 2022-06-25 PROCEDURE — 85025 COMPLETE CBC W/AUTO DIFF WBC: CPT | Performed by: INTERNAL MEDICINE

## 2022-06-25 PROCEDURE — 82140 ASSAY OF AMMONIA: CPT | Performed by: INTERNAL MEDICINE

## 2022-06-25 PROCEDURE — 63600175 PHARM REV CODE 636 W HCPCS: Performed by: INTERNAL MEDICINE

## 2022-06-25 PROCEDURE — P9016 RBC LEUKOCYTES REDUCED: HCPCS | Performed by: INTERNAL MEDICINE

## 2022-06-25 PROCEDURE — 86920 COMPATIBILITY TEST SPIN: CPT | Performed by: INTERNAL MEDICINE

## 2022-06-25 PROCEDURE — 82607 VITAMIN B-12: CPT | Performed by: INTERNAL MEDICINE

## 2022-06-25 PROCEDURE — 80048 BASIC METABOLIC PNL TOTAL CA: CPT | Performed by: INTERNAL MEDICINE

## 2022-06-25 PROCEDURE — 93005 ELECTROCARDIOGRAM TRACING: CPT

## 2022-06-25 PROCEDURE — 25000003 PHARM REV CODE 250: Performed by: NURSE PRACTITIONER

## 2022-06-25 PROCEDURE — 82746 ASSAY OF FOLIC ACID SERUM: CPT | Performed by: INTERNAL MEDICINE

## 2022-06-25 PROCEDURE — 36430 TRANSFUSION BLD/BLD COMPNT: CPT

## 2022-06-25 PROCEDURE — 25000003 PHARM REV CODE 250: Performed by: INTERNAL MEDICINE

## 2022-06-25 PROCEDURE — 11000001 HC ACUTE MED/SURG PRIVATE ROOM

## 2022-06-25 PROCEDURE — 83540 ASSAY OF IRON: CPT | Performed by: INTERNAL MEDICINE

## 2022-06-25 RX ORDER — FOLIC ACID 1 MG/1
1 TABLET ORAL DAILY
Status: DISCONTINUED | OUTPATIENT
Start: 2022-06-26 | End: 2022-06-26 | Stop reason: HOSPADM

## 2022-06-25 RX ORDER — HYDROCODONE BITARTRATE AND ACETAMINOPHEN 500; 5 MG/1; MG/1
TABLET ORAL
Status: DISCONTINUED | OUTPATIENT
Start: 2022-06-25 | End: 2022-06-26 | Stop reason: HOSPADM

## 2022-06-25 RX ADMIN — ISOSORBIDE MONONITRATE 30 MG: 30 TABLET, EXTENDED RELEASE ORAL at 09:06

## 2022-06-25 RX ADMIN — FLUTICASONE PROPIONATE 100 MCG: 50 SPRAY, METERED NASAL at 09:06

## 2022-06-25 RX ADMIN — LEVOTHYROXINE SODIUM 100 MCG: 100 TABLET ORAL at 06:06

## 2022-06-25 RX ADMIN — ACETAMINOPHEN 650 MG: 325 TABLET, FILM COATED ORAL at 09:06

## 2022-06-25 RX ADMIN — ACETAMINOPHEN 650 MG: 325 TABLET, FILM COATED ORAL at 08:06

## 2022-06-25 RX ADMIN — CEFTRIAXONE SODIUM 1 G: 1 INJECTION, POWDER, FOR SOLUTION INTRAMUSCULAR; INTRAVENOUS at 01:06

## 2022-06-25 RX ADMIN — ENOXAPARIN SODIUM 40 MG: 40 INJECTION SUBCUTANEOUS at 04:06

## 2022-06-25 RX ADMIN — ASPIRIN 325 MG ORAL TABLET 325 MG: 325 PILL ORAL at 09:06

## 2022-06-25 RX ADMIN — FERROUS SULFATE TAB 325 MG (65 MG ELEMENTAL FE) 1 EACH: 325 (65 FE) TAB at 09:06

## 2022-06-25 RX ADMIN — ATORVASTATIN CALCIUM 20 MG: 10 TABLET, FILM COATED ORAL at 08:06

## 2022-06-25 RX ADMIN — PANTOPRAZOLE SODIUM 40 MG: 40 TABLET, DELAYED RELEASE ORAL at 09:06

## 2022-06-25 RX ADMIN — LISINOPRIL 20 MG: 20 TABLET ORAL at 09:06

## 2022-06-25 RX ADMIN — FLUTICASONE PROPIONATE 100 MCG: 50 SPRAY, METERED NASAL at 08:06

## 2022-06-25 RX ADMIN — CHOLESTYRAMINE 4 G: 4 POWDER, FOR SUSPENSION ORAL at 09:06

## 2022-06-25 NOTE — PROGRESS NOTES
[facility] Hospital Medicine Progress Note        Chief Complaint: Inpatient Follow-up    HPI:   67-year-old  female with significant history of CAD, HTN, thyroid disease rate patient had a fall 2-3 weeks ago and was started on gabapentin for pain 3-3 doses because the patient started having hallucinations after gabapentin.  She was brought to the ED on 06/23 with worsening confusion, hallucination.  Per the daughter patient was requiring more care including assistance with activities of daily living.  Also reported poor appetite and poor oral intake.  Patient was admitted hospitalist medicine in outlying facility and then later transferred to our facility.  Encephalopathy workup including MRI brain ordered.  Gabapentin held.  Patient also diagnosed with UTI and therefore was initiated on appropriate antibiotics    Interval Hx:   Patient seen at bedside.  Comfortably sitting at the side of the bed.  Her daughter was comp in her head.  She was fully awake, alert and oriented at the time of my rounds.  Daughter reports the patient still continues with hallucinations especially at night.  No overt bleeding.    Objective/physical exam:  General: In no acute distress, afebrile  Chest: Clear to auscultation bilaterally  Heart: S1, S2, no appreciable murmur  Abdomen: Soft, nontender, BS +  MSK: Warm, no lower extremity edema, no clubbing or cyanosis  Neurologic: Alert and oriented x4, moving all extremities with good strength     VITAL SIGNS: 24 HRS MIN & MAX LAST   Temp  Min: 98 °F (36.7 °C)  Max: 98.2 °F (36.8 °C) 98 °F (36.7 °C)   BP  Min: 94/46  Max: 130/65 130/65   Pulse  Min: 50  Max: 72  (!) 50   Resp  Min: 16  Max: 18 16   SpO2  Min: 98 %  Max: 100 % 98 %       Recent Labs   Lab 06/25/22  0549   WBC 5.9   RBC 2.39*   HGB 6.6*   HCT 21.8*   MCV 91.2   MCH 27.6   MCHC 30.3*   RDW 16.9      MPV 12.7*         Recent Labs   Lab 06/24/22  0552 06/25/22  0549     142   K 4.0 3.6   CO2 30 27   BUN 21.1* 13.0   CREATININE 1.37* 1.14*   CALCIUM 9.3 8.4   ALBUMIN 2.6*  --    ALKPHOS 42  --    ALT 8  --    AST 15  --    BILITOT 0.2  --           Microbiology Results (last 7 days)     Procedure Component Value Units Date/Time    Blood culture #2 **CANNOT BE ORDERED STAT** [[id number]]  (Normal) Collected: 06/23/22 2358    Order Status: Completed Specimen: Blood from Arm, Left Updated: 06/25/22 0802     CULTURE, BLOOD ([facility]) No Growth At 24 Hours    Blood culture #1 **CANNOT BE ORDERED STAT** [[id number]]  (Normal) Collected: 06/23/22 2358    Order Status: Completed Specimen: Blood from Arm, Right Updated: 06/25/22 0802     CULTURE, BLOOD ([facility]) No Growth At 24 Hours    Urine culture [[id number]]  (Abnormal) Collected: 06/23/22 2313    Order Status: Completed Specimen: Urine Updated: 06/25/22 0637     Urine Culture >/= 100,000 colonies/ml Gram-negative Rods           Scheduled Med:   aspirin  325 mg Oral Daily    atorvastatin  20 mg Oral QHS    cefTRIAXone (ROCEPHIN) IVPB  1 g Intravenous Q12H    cholestyramine  1 packet Oral Daily    digoxin  0.25 mg Oral Daily    enoxaparin  40 mg Subcutaneous Daily    ferrous sulfate  1 tablet Oral Daily    fluticasone propionate  2 spray Each Nostril BID    isosorbide mononitrate  30 mg Oral Daily    levothyroxine  100 mcg Oral Daily    lisinopriL  20 mg Oral Daily    metoprolol succinate  25 mg Oral Daily    pantoprazole  40 mg Oral Daily          Assessment/Plan:    Acute encephalopathy with hallucinations-gabapentin related versus metabolic  Acute on chronic normocytic anemia  Iron deficiency  Acute kidney injury-improved  Acute bacterial UTI secondary to Gram-negative rods  Sinus bradycardia  Essential HTN-stable  HLD  History of CAD  Hypothyroidism  GERD  Prophylaxis    MRI brain is within normal limits  Vitamin B12 normal  Folate level is low, initiated p.o. folate supplementation  Gabapentin discontinued given that hallucinations  started after gabapentin D  MRI brain is normal  No significant electrolyte abnormalities  TSH is normal, ammonia normal  Patient has UTI, on ceftriaxone  Cultures-Gram-negative rods, follow-up final adjust antibiotics  Obtain blood cultures  Encephalopathy would be either gabapentin related versus UTI related  Hemoglobin significantly dropped to less than 7 today  Transfusing with 2 units PRBC  FOBT ordered  On oral iron  Patient denies overt bleeding  Continue home meds-aspirin, statin, cholestyramine, digoxin, Imdur, levothyroxine, lisinopril,  ppi  Significantly bradycardiac  Hold metoprolol XL  Obtain EKG, echocardiogram  In case of worsening bradycardia consult Cardiology  DVT prophylaxis-Lovenox    Critical care time-35 minutes  Critical care diagnosis-acute on chronic normocytic anemia requiring PRBC transfusion  Critical care interventions: Hands-on evaluation, review of labs/radiographs/records and discussions with patient  Zoila Miranda MD   06/25/2022

## 2022-06-26 VITALS
BODY MASS INDEX: 24.07 KG/M2 | RESPIRATION RATE: 12 BRPM | HEART RATE: 47 BPM | HEIGHT: 64 IN | OXYGEN SATURATION: 99 % | WEIGHT: 141 LBS | TEMPERATURE: 98 F | SYSTOLIC BLOOD PRESSURE: 138 MMHG | DIASTOLIC BLOOD PRESSURE: 65 MMHG

## 2022-06-26 LAB
ALBUMIN SERPL-MCNC: 2.3 GM/DL (ref 3.4–4.8)
ALBUMIN/GLOB SERPL: 0.9 RATIO (ref 1.1–2)
ALP SERPL-CCNC: 44 UNIT/L (ref 40–150)
ALT SERPL-CCNC: 7 UNIT/L (ref 0–55)
AST SERPL-CCNC: 16 UNIT/L (ref 5–34)
AV INDEX (PROSTH): 0.76
AV MEAN GRADIENT: 3 MMHG
AV PEAK GRADIENT: 6 MMHG
AV VALVE AREA: 2.64 CM2
AV VELOCITY RATIO: 0.76
BACTERIA UR CULT: ABNORMAL
BASOPHILS # BLD AUTO: 0.06 X10(3)/MCL (ref 0–0.2)
BASOPHILS NFR BLD AUTO: 0.9 %
BILIRUBIN DIRECT+TOT PNL SERPL-MCNC: 0.5 MG/DL
BSA FOR ECHO PROCEDURE: 1.7 M2
BUN SERPL-MCNC: 12.4 MG/DL (ref 9.8–20.1)
CALCIUM SERPL-MCNC: 8.5 MG/DL (ref 8.4–10.2)
CHLORIDE SERPL-SCNC: 108 MMOL/L (ref 98–107)
CO2 SERPL-SCNC: 25 MMOL/L (ref 23–31)
CREAT SERPL-MCNC: 1.08 MG/DL (ref 0.55–1.02)
CV ECHO LV RWT: 0.46 CM
DOP CALC AO PEAK VEL: 1.19 M/S
DOP CALC AO VTI: 28.2 CM
DOP CALC LVOT AREA: 3.5 CM2
DOP CALC LVOT DIAMETER: 2.1 CM
DOP CALC LVOT PEAK VEL: 0.91 M/S
DOP CALC LVOT STROKE VOLUME: 74.43 CM3
DOP CALCLVOT PEAK VEL VTI: 21.5 CM
E/A RATIO: 1.38
E/E' RATIO: 9.06 M/S
ECHO LV POSTERIOR WALL: 1.06 CM (ref 0.6–1.1)
EJECTION FRACTION: 55 %
EOSINOPHIL # BLD AUTO: 0.24 X10(3)/MCL (ref 0–0.9)
EOSINOPHIL NFR BLD AUTO: 3.8 %
ERYTHROCYTE [DISTWIDTH] IN BLOOD BY AUTOMATED COUNT: 15.7 % (ref 11.5–17)
FRACTIONAL SHORTENING: 34 % (ref 28–44)
GLOBULIN SER-MCNC: 2.5 GM/DL (ref 2.4–3.5)
GLUCOSE SERPL-MCNC: 72 MG/DL (ref 82–115)
HCT VFR BLD AUTO: 29.5 % (ref 37–47)
HGB BLD-MCNC: 9.9 GM/DL (ref 12–16)
IMM GRANULOCYTES # BLD AUTO: 0.1 X10(3)/MCL (ref 0–0.02)
IMM GRANULOCYTES NFR BLD AUTO: 1.6 % (ref 0–0.43)
INTERVENTRICULAR SEPTUM: 0.96 CM (ref 0.6–1.1)
LEFT ATRIUM SIZE: 4.4 CM
LEFT ATRIUM VOLUME INDEX MOD: 36.6 ML/M2
LEFT ATRIUM VOLUME MOD: 61.8 CM3
LEFT INTERNAL DIMENSION IN SYSTOLE: 3.01 CM (ref 2.1–4)
LEFT VENTRICLE DIASTOLIC VOLUME INDEX: 56.45 ML/M2
LEFT VENTRICLE DIASTOLIC VOLUME: 95.4 ML
LEFT VENTRICLE MASS INDEX: 94 G/M2
LEFT VENTRICLE SYSTOLIC VOLUME INDEX: 20.9 ML/M2
LEFT VENTRICLE SYSTOLIC VOLUME: 35.3 ML
LEFT VENTRICULAR INTERNAL DIMENSION IN DIASTOLE: 4.56 CM (ref 3.5–6)
LEFT VENTRICULAR MASS: 158.74 G
LV LATERAL E/E' RATIO: 8.56 M/S
LV SEPTAL E/E' RATIO: 9.63 M/S
LVOT MG: 2 MMHG
LVOT MV: 0.6 CM/S
LYMPHOCYTES # BLD AUTO: 1.27 X10(3)/MCL (ref 0.6–4.6)
LYMPHOCYTES NFR BLD AUTO: 20 %
MCH RBC QN AUTO: 28.7 PG (ref 27–31)
MCHC RBC AUTO-ENTMCNC: 33.6 MG/DL (ref 33–36)
MCV RBC AUTO: 85.5 FL (ref 80–94)
MONOCYTES # BLD AUTO: 0.7 X10(3)/MCL (ref 0.1–1.3)
MONOCYTES NFR BLD AUTO: 11 %
MV PEAK A VEL: 0.56 M/S
MV PEAK E VEL: 0.77 M/S
NEUTROPHILS # BLD AUTO: 4 X10(3)/MCL (ref 2.1–9.2)
NEUTROPHILS NFR BLD AUTO: 62.7 %
NRBC BLD AUTO-RTO: 0 %
PISA TR MAX VEL: 2.27 M/S
PLATELET # BLD AUTO: 254 X10(3)/MCL (ref 130–400)
PMV BLD AUTO: 12.5 FL (ref 9.4–12.4)
POTASSIUM SERPL-SCNC: 3.7 MMOL/L (ref 3.5–5.1)
PROT SERPL-MCNC: 4.8 GM/DL (ref 5.8–7.6)
PV PEAK VELOCITY: 0.81 CM/S
RA PRESSURE: 3 MMHG
RBC # BLD AUTO: 3.45 X10(6)/MCL (ref 4.2–5.4)
SODIUM SERPL-SCNC: 140 MMOL/L (ref 136–145)
TDI LATERAL: 0.09 M/S
TDI SEPTAL: 0.08 M/S
TDI: 0.09 M/S
TR MAX PG: 21 MMHG
TRICUSPID ANNULAR PLANE SYSTOLIC EXCURSION: 1.48 CM
TV REST PULMONARY ARTERY PRESSURE: 24 MMHG
WBC # SPEC AUTO: 6.3 X10(3)/MCL (ref 4.5–11.5)

## 2022-06-26 PROCEDURE — 25000003 PHARM REV CODE 250: Performed by: INTERNAL MEDICINE

## 2022-06-26 PROCEDURE — 85025 COMPLETE CBC W/AUTO DIFF WBC: CPT | Performed by: INTERNAL MEDICINE

## 2022-06-26 PROCEDURE — 63600175 PHARM REV CODE 636 W HCPCS: Performed by: INTERNAL MEDICINE

## 2022-06-26 PROCEDURE — 36415 COLL VENOUS BLD VENIPUNCTURE: CPT | Performed by: INTERNAL MEDICINE

## 2022-06-26 PROCEDURE — 80053 COMPREHEN METABOLIC PANEL: CPT | Performed by: INTERNAL MEDICINE

## 2022-06-26 RX ORDER — SULFAMETHOXAZOLE AND TRIMETHOPRIM 800; 160 MG/1; MG/1
1 TABLET ORAL 2 TIMES DAILY
Qty: 6 TABLET | Refills: 0 | Status: ON HOLD | OUTPATIENT
Start: 2022-06-26 | End: 2022-07-03 | Stop reason: HOSPADM

## 2022-06-26 RX ADMIN — ISOSORBIDE MONONITRATE 30 MG: 30 TABLET, EXTENDED RELEASE ORAL at 08:06

## 2022-06-26 RX ADMIN — CHOLESTYRAMINE 4 G: 4 POWDER, FOR SUSPENSION ORAL at 08:06

## 2022-06-26 RX ADMIN — FERROUS SULFATE TAB 325 MG (65 MG ELEMENTAL FE) 1 EACH: 325 (65 FE) TAB at 08:06

## 2022-06-26 RX ADMIN — LEVOTHYROXINE SODIUM 100 MCG: 100 TABLET ORAL at 08:06

## 2022-06-26 RX ADMIN — LISINOPRIL 20 MG: 20 TABLET ORAL at 08:06

## 2022-06-26 RX ADMIN — PANTOPRAZOLE SODIUM 40 MG: 40 TABLET, DELAYED RELEASE ORAL at 08:06

## 2022-06-26 RX ADMIN — ASPIRIN 325 MG ORAL TABLET 325 MG: 325 PILL ORAL at 08:06

## 2022-06-26 RX ADMIN — FOLIC ACID 1 MG: 1 TABLET ORAL at 08:06

## 2022-06-26 RX ADMIN — FLUTICASONE PROPIONATE 100 MCG: 50 SPRAY, METERED NASAL at 09:06

## 2022-06-26 RX ADMIN — CEFTRIAXONE SODIUM 1 G: 1 INJECTION, POWDER, FOR SOLUTION INTRAMUSCULAR; INTRAVENOUS at 01:06

## 2022-06-26 NOTE — DISCHARGE SUMMARY
[facility]  Hospital Medicine  Discharge Summary      Patient Name: Lila Carmona  MRN: [id number]  Patient Class: IP- Inpatient  Admission Date: 6/23/2022  Hospital Length of Stay: 2 days  Discharge Date and Time:  06/26/2022 11:24 AM  Attending Physician: Gordon Chin MD   Discharging Provider: Maria Isabel Prakash MD  Primary Care Provider: Delma Washburn MD      HPI:   No notes on file    * No surgery found *      Hospital Course:   Patient was seen with her daughters at the bedside.  She had had a good night and still having some hallucinations but improved.  Upon further discussion, she has had these memory problems and hallucinations for quite some time which was exacerbated by the gabapentin.  They have had suspicions of dementia for quite some time.  Additionally, she had been seeing a hematologist and apparently a bone marrow biopsy was recommended but the patient refused at that time.  She also sees an internist and a GI doctor for her chronic GI issues.  She did well throughout the rest of her hospital stay and we will discharge her on a couple more days of antibiotics and a referral to Neurology.  Additionally she will see her physicians now that her daughters are back up to speed with what is going on with her.       Goals of Care Treatment Preferences:  Code Status: Full Code      Consults:   Consults (From admission, onward)        Status Ordering Provider     IP consult to case management  Once        Provider:  (Not yet assigned)    Acknowledged GORDON CHIN          No new Assessment & Plan notes have been filed under this hospital service since the last note was generated.  Service: Hospital Medicine    Final Active Diagnoses:    Diagnosis Date Noted POA    PRINCIPAL PROBLEM:  UTI (urinary tract infection) [N39.0] 06/24/2022 Yes    c [R44.3] 06/24/2022 Unknown    HTN (hypertension) [I10] 06/24/2022 Yes    HLD (hyperlipidemia) [E78.5] 06/24/2022  Yes      Problems Resolved During this Admission:       Discharged Condition: good    Disposition: Home or Self Care    Follow Up:   Follow-up Information     Delma Washburn MD Follow up in 1 week(s).    Specialty: Family Medicine  Contact information:  [address] [facility] [place] [address]  [phone]                       Patient Instructions:      Ambulatory referral/consult to Neurology   Standing Status: Future   Referral Priority: Routine Referral Type: Consultation   Referral Reason: Specialty Services Required   Requested Specialty: Neurology     Diet Cardiac     Notify your health care provider if you experience any of the following:  temperature >100.4     Notify your health care provider if you experience any of the following:  increased confusion or weakness     Activity as tolerated       Significant Diagnostic Studies: Labs:   BMP:   Recent Labs   Lab 06/25/22  0549 06/26/22 0457    140   K 3.6 3.7   CO2 27 25   BUN 13.0 12.4   CREATININE 1.14* 1.08*   CALCIUM 8.4 8.5   , CMP   Recent Labs   Lab 06/25/22  0549 06/26/22 0457    140   K 3.6 3.7   CO2 27 25   BUN 13.0 12.4   CREATININE 1.14* 1.08*   CALCIUM 8.4 8.5   ALBUMIN  --  2.3*   BILITOT  --  0.5   ALKPHOS  --  44   AST  --  16   ALT  --  7   , CBC   Recent Labs   Lab 06/25/22  0549 06/26/22 0457   WBC 5.9 6.3   HGB 6.6* 9.9*   HCT 21.8* 29.5*    254    and Lipid Panel No results found for: CHOL, HDL, LDLCALC, TRIG, CHOLHDL  Microbiology:   Urine Culture    Lab Results   Component Value Date    LABURIN >/= 100,000 colonies/ml Escherichia coli (A) 06/23/2022         Pending Diagnostic Studies:     Procedure Component Value Units Date/Time    Occult Blood, Stool, Diagnostic (1-3) [[id number]]     Order Status: Sent Lab Status: No result     Specimen: Stool     Narrative:      The following orders were created for panel order Occult Blood, Stool, Diagnostic (1-3).  Procedure                               Abnormality          Status                     ---------                               -----------         ------                     Occult blood x 3, stool[[id number]]                                                       Please view results for these tests on the individual orders.    Occult blood x 3, stool [[id number]]     Order Status: Sent Lab Status: No result     Specimen: Stool          Medications:  Reconciled Home Medications:      Medication List      START taking these medications    sulfamethoxazole-trimethoprim 800-160mg 800-160 mg Tab  Commonly known as: BACTRIM DS  Take 1 tablet by mouth 2 (two) times daily. for 3 days        CONTINUE taking these medications    albuterol 90 mcg/actuation inhaler  Commonly known as: PROVENTIL/VENTOLIN HFA  1 puff as needed     aspirin 325 MG tablet  1 tablet     atorvastatin 20 MG tablet  Commonly known as: LIPITOR  1 tablet     cholestyramine (with sugar) 4 gram Powd  1 scoop     digoxin 250 mcg tablet  Commonly known as: LANOXIN  1 tablet     ferrous gluconate 324 MG tablet  Commonly known as: FERGON  TAKE 1 TABLET BY MOUTH EVERY DAY     fluticasone propionate 50 mcg/actuation nasal spray  Commonly known as: FLONASE  by Nasal route.     hydroCHLOROthiazide 25 MG tablet  Commonly known as: HYDRODIURIL  1 tablet     isosorbide mononitrate 30 MG 24 hr tablet  Commonly known as: IMDUR  1/2 tablet     levothyroxine 100 MCG tablet  Commonly known as: SYNTHROID  Take 100 mcg by mouth once daily.     lisinopriL 20 MG tablet  Commonly known as: PRINIVIL,ZESTRIL  1 Tablet     metoprolol succinate 25 MG 24 hr tablet  Commonly known as: TOPROL-XL  1/2 tablet     pantoprazole 40 MG tablet  Commonly known as: PROTONIX  1 tablet     potassium chloride 10% 20 mEq/15 mL oral solution  Commonly known as: KAYCIEL  TAKE 15 ML BY MOUTH DAILY     predniSONE 20 MG tablet  Commonly known as: DELTASONE  Take by mouth.     torsemide 20 MG Tab  Commonly known as: DEMADEX  Take 20 mg by mouth once daily.      TRACLEER 125 MG Tab  Generic drug: bosentan  1 Tablet            Indwelling Lines/Drains at time of discharge:   Lines/Drains/Airways     None                 Time spent on the discharge of patient: 40 minutes         Maria Isabel Prakash MD  Department of Hospital Medicine  [facility]

## 2022-06-26 NOTE — HOSPITAL COURSE
Patient was seen with her daughters at the bedside.  She had had a good night and still having some hallucinations but improved.  Upon further discussion, she has had these memory problems and hallucinations for quite some time which was exacerbated by the gabapentin.  They have had suspicions of dementia for quite some time.  Additionally, she had been seeing a hematologist and apparently a bone marrow biopsy was recommended but the patient refused at that time.  She also sees an internist and a GI doctor for her chronic GI issues.  She did well throughout the rest of her hospital stay and we will discharge her on a couple more days of antibiotics and a referral to Neurology.  Additionally she will see her physicians now that her daughters are back up to speed with what is going on with her.

## 2022-06-27 ENCOUNTER — HOSPITAL ENCOUNTER (INPATIENT)
Facility: HOSPITAL | Age: 67
LOS: 6 days | Discharge: HOME HEALTH | DRG: 125 | End: 2022-07-03
Attending: STUDENT IN AN ORGANIZED HEALTH CARE EDUCATION/TRAINING PROGRAM | Admitting: INTERNAL MEDICINE
Payer: MEDICARE

## 2022-06-27 ENCOUNTER — PATIENT OUTREACH (OUTPATIENT)
Dept: ADMINISTRATIVE | Facility: CLINIC | Age: 67
End: 2022-06-27
Payer: MEDICARE

## 2022-06-27 DIAGNOSIS — M25.572 LEFT ANKLE PAIN: ICD-10-CM

## 2022-06-27 DIAGNOSIS — E16.2 HYPOGLYCEMIA: ICD-10-CM

## 2022-06-27 DIAGNOSIS — R10.9 RIGHT FLANK PAIN: ICD-10-CM

## 2022-06-27 DIAGNOSIS — E86.0 DEHYDRATION: ICD-10-CM

## 2022-06-27 DIAGNOSIS — R44.3 HALLUCINATIONS: Primary | ICD-10-CM

## 2022-06-27 LAB
ALBUMIN SERPL-MCNC: 2.8 GM/DL (ref 3.4–4.8)
ALBUMIN SERPL-MCNC: 3.3 GM/DL (ref 3.4–4.8)
ALBUMIN/GLOB SERPL: 0.7 RATIO (ref 1.1–2)
ALBUMIN/GLOB SERPL: 0.8 RATIO (ref 1.1–2)
ALP SERPL-CCNC: 49 UNIT/L (ref 40–150)
ALP SERPL-CCNC: 58 UNIT/L (ref 40–150)
ALT SERPL-CCNC: 10 UNIT/L (ref 0–55)
ALT SERPL-CCNC: 11 UNIT/L (ref 0–55)
APPEARANCE UR: CLEAR
AST SERPL-CCNC: 19 UNIT/L (ref 5–34)
AST SERPL-CCNC: 23 UNIT/L (ref 5–34)
BASOPHILS # BLD AUTO: 0.05 X10(3)/MCL (ref 0–0.2)
BASOPHILS # BLD AUTO: 0.08 X10(3)/MCL (ref 0–0.2)
BASOPHILS NFR BLD AUTO: 0.6 %
BASOPHILS NFR BLD AUTO: 0.7 %
BILIRUB UR QL STRIP.AUTO: NEGATIVE MG/DL
BILIRUBIN DIRECT+TOT PNL SERPL-MCNC: 0.4 MG/DL
BILIRUBIN DIRECT+TOT PNL SERPL-MCNC: 0.5 MG/DL
BNP BLD-MCNC: 134.3 PG/ML
BUN SERPL-MCNC: 9.8 MG/DL (ref 9.8–20.1)
BUN SERPL-MCNC: 9.9 MG/DL (ref 9.8–20.1)
CALCIUM SERPL-MCNC: 10.1 MG/DL (ref 8.4–10.2)
CALCIUM SERPL-MCNC: 9.8 MG/DL (ref 8.4–10.2)
CHLORIDE SERPL-SCNC: 102 MMOL/L (ref 98–107)
CHLORIDE SERPL-SCNC: 104 MMOL/L (ref 98–107)
CO2 SERPL-SCNC: 26 MMOL/L (ref 23–31)
CO2 SERPL-SCNC: 31 MMOL/L (ref 23–31)
COLOR UR AUTO: YELLOW
CREAT SERPL-MCNC: 1.08 MG/DL (ref 0.55–1.02)
CREAT SERPL-MCNC: 1.28 MG/DL (ref 0.55–1.02)
EOSINOPHIL # BLD AUTO: 0.12 X10(3)/MCL (ref 0–0.9)
EOSINOPHIL # BLD AUTO: 0.19 X10(3)/MCL (ref 0–0.9)
EOSINOPHIL NFR BLD AUTO: 1.4 %
EOSINOPHIL NFR BLD AUTO: 1.6 %
ERYTHROCYTE [DISTWIDTH] IN BLOOD BY AUTOMATED COUNT: 15.6 % (ref 11.5–17)
ERYTHROCYTE [DISTWIDTH] IN BLOOD BY AUTOMATED COUNT: 15.8 % (ref 11.5–17)
GLOBULIN SER-MCNC: 3.6 GM/DL (ref 2.4–3.5)
GLOBULIN SER-MCNC: 4.6 GM/DL (ref 2.4–3.5)
GLUCOSE SERPL-MCNC: 76 MG/DL (ref 82–115)
GLUCOSE SERPL-MCNC: 76 MG/DL (ref 82–115)
GLUCOSE UR QL STRIP.AUTO: NEGATIVE MG/DL
HCT VFR BLD AUTO: 33.8 % (ref 37–47)
HCT VFR BLD AUTO: 38.4 % (ref 37–47)
HGB BLD-MCNC: 11.3 GM/DL (ref 12–16)
HGB BLD-MCNC: 12.6 GM/DL (ref 12–16)
IMM GRANULOCYTES # BLD AUTO: 0.06 X10(3)/MCL (ref 0–0.02)
IMM GRANULOCYTES # BLD AUTO: 0.11 X10(3)/MCL (ref 0–0.02)
IMM GRANULOCYTES NFR BLD AUTO: 0.7 % (ref 0–0.43)
IMM GRANULOCYTES NFR BLD AUTO: 1 % (ref 0–0.43)
KETONES UR QL STRIP.AUTO: NEGATIVE MG/DL
LEUKOCYTE ESTERASE UR QL STRIP.AUTO: NEGATIVE UNIT/L
LYMPHOCYTES # BLD AUTO: 1.03 X10(3)/MCL (ref 0.6–4.6)
LYMPHOCYTES # BLD AUTO: 1.29 X10(3)/MCL (ref 0.6–4.6)
LYMPHOCYTES NFR BLD AUTO: 11.1 %
LYMPHOCYTES NFR BLD AUTO: 11.6 %
MCH RBC QN AUTO: 29 PG (ref 27–31)
MCH RBC QN AUTO: 29.3 PG (ref 27–31)
MCHC RBC AUTO-ENTMCNC: 32.8 MG/DL (ref 33–36)
MCHC RBC AUTO-ENTMCNC: 33.4 MG/DL (ref 33–36)
MCV RBC AUTO: 87.6 FL (ref 80–94)
MCV RBC AUTO: 88.3 FL (ref 80–94)
MONOCYTES # BLD AUTO: 0.91 X10(3)/MCL (ref 0.1–1.3)
MONOCYTES # BLD AUTO: 1.06 X10(3)/MCL (ref 0.1–1.3)
MONOCYTES NFR BLD AUTO: 10.2 %
MONOCYTES NFR BLD AUTO: 9.2 %
NEUTROPHILS # BLD AUTO: 6.7 X10(3)/MCL (ref 2.1–9.2)
NEUTROPHILS # BLD AUTO: 8.8 X10(3)/MCL (ref 2.1–9.2)
NEUTROPHILS NFR BLD AUTO: 75.5 %
NEUTROPHILS NFR BLD AUTO: 76.4 %
NITRITE UR QL STRIP.AUTO: NEGATIVE
NRBC BLD AUTO-RTO: 0 %
NRBC BLD AUTO-RTO: 0 %
PH UR STRIP.AUTO: 6 [PH]
PLATELET # BLD AUTO: 255 X10(3)/MCL (ref 130–400)
PLATELET # BLD AUTO: 308 X10(3)/MCL (ref 130–400)
PMV BLD AUTO: 11.4 FL (ref 9.4–12.4)
PMV BLD AUTO: 11.6 FL (ref 9.4–12.4)
POTASSIUM SERPL-SCNC: 3.5 MMOL/L (ref 3.5–5.1)
POTASSIUM SERPL-SCNC: 3.7 MMOL/L (ref 3.5–5.1)
PROT SERPL-MCNC: 6.4 GM/DL (ref 5.8–7.6)
PROT SERPL-MCNC: 7.9 GM/DL (ref 5.8–7.6)
PROT UR QL STRIP.AUTO: NEGATIVE MG/DL
RBC # BLD AUTO: 3.86 X10(6)/MCL (ref 4.2–5.4)
RBC # BLD AUTO: 4.35 X10(6)/MCL (ref 4.2–5.4)
RBC UR QL AUTO: NEGATIVE UNIT/L
SODIUM SERPL-SCNC: 141 MMOL/L (ref 136–145)
SODIUM SERPL-SCNC: 143 MMOL/L (ref 136–145)
SP GR UR STRIP.AUTO: 1.01
UROBILINOGEN UR STRIP-ACNC: 0.2 MG/DL
WBC # SPEC AUTO: 11.6 X10(3)/MCL (ref 4.5–11.5)
WBC # SPEC AUTO: 8.9 X10(3)/MCL (ref 4.5–11.5)

## 2022-06-27 PROCEDURE — 83880 ASSAY OF NATRIURETIC PEPTIDE: CPT | Performed by: STUDENT IN AN ORGANIZED HEALTH CARE EDUCATION/TRAINING PROGRAM

## 2022-06-27 PROCEDURE — 25000003 PHARM REV CODE 250: Performed by: INTERNAL MEDICINE

## 2022-06-27 PROCEDURE — 25000003 PHARM REV CODE 250: Performed by: STUDENT IN AN ORGANIZED HEALTH CARE EDUCATION/TRAINING PROGRAM

## 2022-06-27 PROCEDURE — 36415 COLL VENOUS BLD VENIPUNCTURE: CPT | Performed by: STUDENT IN AN ORGANIZED HEALTH CARE EDUCATION/TRAINING PROGRAM

## 2022-06-27 PROCEDURE — 81003 URINALYSIS AUTO W/O SCOPE: CPT | Performed by: STUDENT IN AN ORGANIZED HEALTH CARE EDUCATION/TRAINING PROGRAM

## 2022-06-27 PROCEDURE — 11000001 HC ACUTE MED/SURG PRIVATE ROOM

## 2022-06-27 PROCEDURE — 80053 COMPREHEN METABOLIC PANEL: CPT | Performed by: STUDENT IN AN ORGANIZED HEALTH CARE EDUCATION/TRAINING PROGRAM

## 2022-06-27 PROCEDURE — 85025 COMPLETE CBC W/AUTO DIFF WBC: CPT | Performed by: STUDENT IN AN ORGANIZED HEALTH CARE EDUCATION/TRAINING PROGRAM

## 2022-06-27 PROCEDURE — 63600175 PHARM REV CODE 636 W HCPCS: Performed by: STUDENT IN AN ORGANIZED HEALTH CARE EDUCATION/TRAINING PROGRAM

## 2022-06-27 PROCEDURE — 99285 EMERGENCY DEPT VISIT HI MDM: CPT | Mod: 25

## 2022-06-27 RX ORDER — SODIUM CHLORIDE, SODIUM LACTATE, POTASSIUM CHLORIDE, CALCIUM CHLORIDE 600; 310; 30; 20 MG/100ML; MG/100ML; MG/100ML; MG/100ML
INJECTION, SOLUTION INTRAVENOUS CONTINUOUS
Status: DISCONTINUED | OUTPATIENT
Start: 2022-06-27 | End: 2022-06-27

## 2022-06-27 RX ORDER — SODIUM CHLORIDE 0.9 % (FLUSH) 0.9 %
10 SYRINGE (ML) INJECTION
Status: DISCONTINUED | OUTPATIENT
Start: 2022-06-27 | End: 2022-07-03 | Stop reason: HOSPADM

## 2022-06-27 RX ORDER — LANOLIN ALCOHOL/MO/W.PET/CERES
1 CREAM (GRAM) TOPICAL DAILY
Status: DISCONTINUED | OUTPATIENT
Start: 2022-06-27 | End: 2022-07-03 | Stop reason: HOSPADM

## 2022-06-27 RX ORDER — ACETAMINOPHEN 325 MG/1
650 TABLET ORAL
Status: COMPLETED | OUTPATIENT
Start: 2022-06-27 | End: 2022-06-27

## 2022-06-27 RX ORDER — HYDROCHLOROTHIAZIDE 25 MG/1
25 TABLET ORAL DAILY
Status: DISCONTINUED | OUTPATIENT
Start: 2022-06-27 | End: 2022-06-27

## 2022-06-27 RX ORDER — ALBUTEROL SULFATE 90 UG/1
1 AEROSOL, METERED RESPIRATORY (INHALATION) EVERY 4 HOURS PRN
Status: DISCONTINUED | OUTPATIENT
Start: 2022-06-27 | End: 2022-07-03 | Stop reason: HOSPADM

## 2022-06-27 RX ORDER — ENOXAPARIN SODIUM 100 MG/ML
40 INJECTION SUBCUTANEOUS EVERY 24 HOURS
Status: DISCONTINUED | OUTPATIENT
Start: 2022-06-27 | End: 2022-07-01

## 2022-06-27 RX ORDER — FLUTICASONE PROPIONATE 50 MCG
1 SPRAY, SUSPENSION (ML) NASAL DAILY
Status: DISCONTINUED | OUTPATIENT
Start: 2022-06-28 | End: 2022-07-03 | Stop reason: HOSPADM

## 2022-06-27 RX ORDER — LISINOPRIL 20 MG/1
20 TABLET ORAL DAILY
Status: DISCONTINUED | OUTPATIENT
Start: 2022-06-27 | End: 2022-06-30

## 2022-06-27 RX ORDER — ACETAMINOPHEN 325 MG/1
650 TABLET ORAL EVERY 4 HOURS PRN
Status: DISCONTINUED | OUTPATIENT
Start: 2022-06-27 | End: 2022-07-03 | Stop reason: HOSPADM

## 2022-06-27 RX ORDER — CHOLESTYRAMINE 4 G/9G
1 POWDER, FOR SUSPENSION ORAL DAILY
Status: DISCONTINUED | OUTPATIENT
Start: 2022-06-27 | End: 2022-07-03 | Stop reason: HOSPADM

## 2022-06-27 RX ORDER — ISOSORBIDE MONONITRATE 30 MG/1
30 TABLET, EXTENDED RELEASE ORAL DAILY
Status: DISCONTINUED | OUTPATIENT
Start: 2022-06-28 | End: 2022-07-03 | Stop reason: HOSPADM

## 2022-06-27 RX ORDER — ATORVASTATIN CALCIUM 10 MG/1
20 TABLET, FILM COATED ORAL NIGHTLY
Status: DISCONTINUED | OUTPATIENT
Start: 2022-06-27 | End: 2022-07-03 | Stop reason: HOSPADM

## 2022-06-27 RX ORDER — PANTOPRAZOLE SODIUM 40 MG/1
40 TABLET, DELAYED RELEASE ORAL DAILY
Status: DISCONTINUED | OUTPATIENT
Start: 2022-06-27 | End: 2022-07-03 | Stop reason: HOSPADM

## 2022-06-27 RX ORDER — ISOSORBIDE MONONITRATE 30 MG/1
30 TABLET, EXTENDED RELEASE ORAL DAILY
Status: DISCONTINUED | OUTPATIENT
Start: 2022-06-27 | End: 2022-06-27

## 2022-06-27 RX ORDER — SULFAMETHOXAZOLE AND TRIMETHOPRIM 800; 160 MG/1; MG/1
1 TABLET ORAL 2 TIMES DAILY
Status: COMPLETED | OUTPATIENT
Start: 2022-06-27 | End: 2022-06-28

## 2022-06-27 RX ORDER — HYDROCHLOROTHIAZIDE 25 MG/1
25 TABLET ORAL DAILY
Status: DISCONTINUED | OUTPATIENT
Start: 2022-06-28 | End: 2022-06-30

## 2022-06-27 RX ORDER — LEVOTHYROXINE SODIUM 100 UG/1
100 TABLET ORAL
Status: DISCONTINUED | OUTPATIENT
Start: 2022-06-27 | End: 2022-07-03 | Stop reason: HOSPADM

## 2022-06-27 RX ORDER — ASPIRIN 325 MG
325 TABLET ORAL DAILY
Status: DISCONTINUED | OUTPATIENT
Start: 2022-06-27 | End: 2022-07-02

## 2022-06-27 RX ORDER — DIGOXIN 125 MCG
0.25 TABLET ORAL DAILY
Status: DISCONTINUED | OUTPATIENT
Start: 2022-06-27 | End: 2022-07-03 | Stop reason: HOSPADM

## 2022-06-27 RX ORDER — PREDNISONE 20 MG/1
20 TABLET ORAL DAILY
Status: DISCONTINUED | OUTPATIENT
Start: 2022-06-27 | End: 2022-06-29

## 2022-06-27 RX ORDER — LEVOTHYROXINE SODIUM 100 UG/1
100 TABLET ORAL DAILY
Status: DISCONTINUED | OUTPATIENT
Start: 2022-06-27 | End: 2022-06-27

## 2022-06-27 RX ORDER — TORSEMIDE 20 MG/1
20 TABLET ORAL DAILY
Status: DISCONTINUED | OUTPATIENT
Start: 2022-06-27 | End: 2022-06-30

## 2022-06-27 RX ADMIN — ACETAMINOPHEN 650 MG: 325 TABLET, FILM COATED ORAL at 11:06

## 2022-06-27 RX ADMIN — SULFAMETHOXAZOLE AND TRIMETHOPRIM 1 TABLET: 800; 160 TABLET ORAL at 09:06

## 2022-06-27 RX ADMIN — ATORVASTATIN CALCIUM 20 MG: 10 TABLET, FILM COATED ORAL at 08:06

## 2022-06-27 RX ADMIN — SODIUM CHLORIDE, POTASSIUM CHLORIDE, SODIUM LACTATE AND CALCIUM CHLORIDE: 600; 310; 30; 20 INJECTION, SOLUTION INTRAVENOUS at 05:06

## 2022-06-27 RX ADMIN — ACETAMINOPHEN 650 MG: 325 TABLET ORAL at 02:06

## 2022-06-27 RX ADMIN — ACETAMINOPHEN 650 MG: 325 TABLET, FILM COATED ORAL at 08:06

## 2022-06-27 NOTE — Clinical Note
Diagnosis: Hallucinations [780.1.ICD-9-CM]   Admitting Provider:: JUAN MANUEL DOW [[id number]]   Future Attending Provider: JUAN MANUEL DOW [[id number]]   Reason for IP Medical Treatment  (Clinical interventions that can only be accomplished in the IP setting? ) :: In-patient psychiatric consult, IV fluids, possible nursing home placement   Estimated Length of Stay:: 2 midnights   I certify that Inpatient services for greater than or equal to 2 midnights are medically necessary:: Yes   Plans for Post-Acute care--if anticipated (pick the single best option):: D. Skilled Nursing Placement

## 2022-06-27 NOTE — H&P
[facility] EMERGENCY DEPARTMENT    Hospital Medicine History & Physical Examination       Patient Name: Lila Carmona  MRN: [id number]  Patient Class: IP- Inpatient   Admission Date: 6/27/2022   Admitting Physician: TYLOR Service   Length of Stay: 0  Attending Physician: Rogers Scherer MD  Primary Care Provider: Delma Washburn MD  Face-to-Face encounter date: 06/27/2022    Code Status: Full Code    Chief Complaint: Flank Pain (Right flank and abdominal pain. Still having hallucinations and pain swelling left lower leg)          HISTORY OF PRESENT ILLNESS:   Lila Carmona is a 67 y.o. female who  has a past medical history of Coronary artery disease, Hypertension, and Thyroid disease.. The patient presented to [facility] on 6/27/2022 with a primary complaint of hallucinations.    She was just discharged yesterday from the hospital after admission for UTI and hallucinations. She says the visual hallucinations worsened after she went home. She sees people or animals. She also had right lower quadrant/groin and right lower flank pain. She denies having hallucinations prior to a week ago. Denies hx of depression, anxiety or other psychiatric diagnoses. No family is at bedside for additional history. She reports having mild forgetfulness at times. She denies being officially diagnosed with dementia.    PAST MEDICAL HISTORY:     Past Medical History:   Diagnosis Date    Coronary artery disease     Hypertension     Thyroid disease        PAST SURGICAL HISTORY:     Past Surgical History:   Procedure Laterality Date    CORONARY ARTERY BYPASS GRAFT (CABG)         ALLERGIES:   Adhesive tape-silicones and Latex    FAMILY HISTORY:   family history includes Dementia in her mother; Heart disease in her father and mother.    SOCIAL HISTORY:     Social History     Tobacco Use    Smoking status: Never Smoker    Smokeless tobacco: Never Used   Substance Use Topics    Alcohol use: Not on file      Comment: [facility]        HOME MEDICATIONS:     Prior to Admission medications    Medication Sig Start Date End Date Taking? Authorizing Provider   albuterol (PROVENTIL/VENTOLIN HFA) 90 mcg/actuation inhaler 1 puff as needed    Historical Provider   aspirin 325 MG tablet 1 tablet    Historical Provider   atorvastatin (LIPITOR) 20 MG tablet 1 tablet    Historical Provider   bosentan (TRACLEER) 125 MG Tab 1 Tablet 4/1/22   Historical Provider   cholestyramine, with sugar, 4 gram Powd 1 scoop 4/6/22   Historical Provider   digoxin (LANOXIN) 250 mcg tablet 1 tablet 4/1/22   Historical Provider   ferrous gluconate (FERGON) 324 MG tablet   TAKE 1 TABLET BY MOUTH EVERY DAY 4/1/22   Historical Provider   fluticasone propionate (FLONASE) 50 mcg/actuation nasal spray by Nasal route. 4/1/22   Historical Provider   hydroCHLOROthiazide (HYDRODIURIL) 25 MG tablet 1 tablet 4/1/22   Historical Provider   isosorbide mononitrate (IMDUR) 30 MG 24 hr tablet 1/2 tablet 4/1/22   Historical Provider   levothyroxine (SYNTHROID) 100 MCG tablet Take 100 mcg by mouth once daily. 5/27/22   Historical Provider   lisinopriL (PRINIVIL,ZESTRIL) 20 MG tablet 1 Tablet 4/1/22   Historical Provider   metoprolol succinate (TOPROL-XL) 25 MG 24 hr tablet 1/2 tablet    Historical Provider   pantoprazole (PROTONIX) 40 MG tablet 1 tablet 4/1/22   Historical Provider   potassium chloride 10% (KAYCIEL) 20 mEq/15 mL oral solution   TAKE 15 ML BY MOUTH DAILY 4/1/22   Historical Provider   predniSONE (DELTASONE) 20 MG tablet Take by mouth. 6/6/22   Historical Provider   sulfamethoxazole-trimethoprim 800-160mg (BACTRIM DS) 800-160 mg Tab Take 1 tablet by mouth 2 (two) times daily. for 3 days 6/26/22 6/29/22  Maria Isabel Prakash MD   torsemide (DEMADEX) 20 MG Tab Take 20 mg by mouth once daily. 6/8/22   Historical Provider       REVIEW OF SYSTEMS:   Except as documented, all other systems reviewed and negative   Review of Systems   Musculoskeletal:        Left  ankle pain  Chronic left leg swelling   All other systems reviewed and are negative.        PHYSICAL EXAM:     VITAL SIGNS: 24 HRS MIN & MAX LAST   Temp  Min: 98.1 °F (36.7 °C)  Max: 98.2 °F (36.8 °C) 98.1 °F (36.7 °C)   BP  Min: 129/69  Max: 170/85 129/69   Pulse  Min: 73  Max: 82  82   Resp  Min: 18  Max: 20 18   SpO2  Min: 96 %  Max: 100 % 96 %       General appearance: Well-developed, well-nourished female in no apparent distress.  HENT: Atraumatic head. Moist mucous membranes of oral cavity.  Eyes: Normal extraocular movements.   Neck: Supple.   Lungs: Clear to auscultation bilaterally. No wheezing present.   Heart: Regular rate and rhythm. S1 and S2 present with no murmurs/gallop/rub. No pedal edema. No JVD present.   Abdomen: Soft, non-distended, non-tender. No rebound tenderness/guarding. Bowel sounds are normal.   Extremities: No cyanosis, clubbing. Trache edema LLE  Skin: No Rash.   Neuro: Motor and sensory exams grossly intact. Good tone.   Psych/mental status: Appropriate mood and affect. Responds appropriately to questions. A&OX3    LABS AND IMAGING:     Recent Labs   Lab 06/26/22  0457 06/27/22  0150 06/27/22  0555   WBC 6.3 11.6* 8.9   RBC 3.45* 4.35 3.86*   HGB 9.9* 12.6 11.3*   HCT 29.5* 38.4 33.8*   MCV 85.5 88.3 87.6   MCH 28.7 29.0 29.3   MCHC 33.6 32.8* 33.4   RDW 15.7 15.6 15.8    308 255   MPV 12.5* 11.6 11.4       Recent Labs   Lab 06/26/22  0457 06/27/22  0150 06/27/22  0555    141 143   K 3.7 3.7 3.5   CO2 25 26 31   BUN 12.4 9.8 9.9   CREATININE 1.08* 1.28* 1.08*   CALCIUM 8.5 10.1 9.8   ALBUMIN 2.3* 3.3* 2.8*   ALKPHOS 44 58 49   ALT 7 11 10   AST 16 23 19   BILITOT 0.5 0.5 0.4       Microbiology Results (last 7 days)     ** No results found for the last 168 hours. **           CT Abdomen Pelvis  Without Contrast  Narrative: EXAMINATION:  CT ABDOMEN PELVIS WITHOUT CONTRAST    CLINICAL HISTORY:  Flank pain, kidney stone suspected;    TECHNIQUE:  Helical acquisition through the  abdomen and pelvis without IV contrast.  Lack of contrast limits evaluation of solid organs and vascular structures .  Three plane reconstructions were provided for review.  mGycm. Automatic exposure control, adjustment of mA/kV or iterative reconstruction technique was used to reduce radiation.    COMPARISON:  No priors    FINDINGS:  The limited imaged lung bases are clear.  There are coronary artery calcifications.    Noncontrast liver, spleen, pancreas and adrenals are within normal limits.  Gallbladder surgically absent.  No hydronephrosis.  There is a small nonobstructing calculus left kidney.    No bowel obstruction. No significant inflammatory changes of the bowel.  Normal appendix.  No free air.    Urinary bladder is not well distended.  No pelvic free fluid.  No adnexal mass.  The abdominal aorta is normal in caliber.  There is dense atherosclerotic disease.    Moderate degenerative change of the spine.  Impression: No acute abdominopelvic findings on this noncontrast scan.    No significant discrepancy with the preliminary report.    Electronically signed by: Marcos Holliday  Date:    06/27/2022  Time:    07:27  X-Ray Ankle Complete Left  Narrative: EXAMINATION:  Three radiographic views of the LEFT ANKLE.    CLINICAL HISTORY:  Pain in left ankle and joints of left foot    TECHNIQUE:  Three radiographic views of the LEFT ANKLE.    COMPARISON:  None.    FINDINGS:  Three views of the left ankle demonstrate normal alignment.  There is no fracture.  There is no bony mass lesion.  There is diffuse soft tissue swelling.  Impression: Diffuse soft tissue swelling without underlying osseous abnormality.    Electronically signed by: Lan Diamond MD  Date:    06/27/2022  Time:    06:55        __________________________________________________________________________  INPATIENT LIST OF MEDICATIONS     Scheduled Meds:   aspirin  325 mg Oral Daily    atorvastatin  20 mg Oral QHS    cholestyramine  1 packet Oral  Daily    digoxin  0.25 mg Oral Daily    enoxaparin  40 mg Subcutaneous Daily    ferrous sulfate  1 tablet Oral Daily    [START ON 6/28/2022] fluticasone propionate  1 spray Each Nostril Daily    hydroCHLOROthiazide  25 mg Oral Daily    isosorbide mononitrate  30 mg Oral Daily    levothyroxine  100 mcg Oral Daily    lisinopriL  20 mg Oral Daily    metoprolol succinate  12.5 mg Oral Daily    pantoprazole  40 mg Oral Daily    potassium bicarbonate  10 mEq Oral Daily    predniSONE  20 mg Oral Daily    sulfamethoxazole-trimethoprim 800-160mg  1 tablet Oral BID    torsemide  20 mg Oral Daily     Continuous Infusions:    PRN Meds:acetaminophen, albuterol, sodium chloride 0.9%, sodium chloride 0.9%          ASSESSMENT & PLAN:     Visual Hallucinations  Recent Ecoli UTI  HTN    Plan  Admit and consult psych to evaluate hallucinations  Review and resume home meds  Clarify pt's home dose of Eunice Scherer MD   06/27/2022

## 2022-06-27 NOTE — ED PROVIDER NOTES
Encounter Date: 6/27/2022       History     Chief Complaint   Patient presents with    Flank Pain     Right flank and abdominal pain. Still having hallucinations and pain swelling left lower leg     The history is provided by the patient, a relative and medical records.     67-year-old female with a past medical history of CAD status post CABG, hypertension, hypothyroidism, recent hallucinations and recent admission to the hospital secondary to a urinary tract infection in the hallucination workup that was discharged yesterday, 06/26/2022 approximately 12 hours ago presents emergency department for right-sided flank pain.  Patient states she started having the flank pain while in the hospital although forgot to mention anything about it.  Denies any burning on urination or blood in her urine.  Denies having any constipation or diarrhea.  States that the pain is worse if she moves around.  No nausea or vomiting.  Denies any chest pain.  Patient also states that she noticed that her left leg is becoming more swollen and painful.  Denies having any falls.    Review of patient's allergies indicates:   Allergen Reactions    Adhesive tape-silicones     Latex Rash     Past Medical History:   Diagnosis Date    Coronary artery disease     Hypertension     Thyroid disease      Past Surgical History:   Procedure Laterality Date    CORONARY ARTERY BYPASS GRAFT (CABG)       Family History   Problem Relation Age of Onset    Heart disease Mother     Dementia Mother     Heart disease Father      Social History     Tobacco Use    Smoking status: Never Smoker    Smokeless tobacco: Never Used   Substance Use Topics    Drug use: Never     Review of Systems   Constitutional: Negative for fever.   Respiratory: Negative for shortness of breath.    Cardiovascular: Positive for leg swelling. Negative for chest pain and palpitations.   Gastrointestinal: Positive for abdominal pain. Negative for constipation, diarrhea, nausea and  vomiting.   Genitourinary: Negative for dysuria.   Musculoskeletal: Positive for joint swelling.   All other systems reviewed and are negative.      Physical Exam     Initial Vitals [06/27/22 0144]   BP Pulse Resp Temp SpO2   (!) 157/80 73 20 98.1 °F (36.7 °C) 98 %      MAP       --         Physical Exam    Nursing note and vitals reviewed.  Constitutional: She appears well-developed and well-nourished. No distress.   Cardiovascular: Normal rate and regular rhythm.   Pulmonary/Chest: Breath sounds normal. No respiratory distress.   Abdominal: Abdomen is soft. Bowel sounds are normal. There is abdominal tenderness ( right lower quadrant/right flank). There is no rebound and no guarding.   Musculoskeletal:         General: Tenderness (In tenderness to palpation to left ankle and left leg) and edema ( 2+ pitting edema to left lower extremity; +1 pitting edema to right lower extremity) present. Normal range of motion.     Neurological: She is alert and oriented to person, place, and time.   Skin: Skin is warm. Capillary refill takes less than 2 seconds.   Psychiatric: She has a normal mood and affect. Thought content normal.         ED Course   Procedures  Labs Reviewed   COMPREHENSIVE METABOLIC PANEL - Abnormal; Notable for the following components:       Result Value    Glucose Level 76 (*)     Creatinine 1.28 (*)     Protein Total 7.9 (*)     Albumin Level 3.3 (*)     Globulin 4.6 (*)     Albumin/Globulin Ratio 0.7 (*)     All other components within normal limits   CBC WITH DIFFERENTIAL - Abnormal; Notable for the following components:    WBC 11.6 (*)     MCHC 32.8 (*)     IG# 0.11 (*)     IG% 1.0 (*)     All other components within normal limits   B-TYPE NATRIURETIC PEPTIDE - Abnormal; Notable for the following components:    Natriuretic Peptide 134.3 (*)     All other components within normal limits   URINALYSIS, REFLEX TO URINE CULTURE   CBC W/ AUTO DIFFERENTIAL    Narrative:     The following orders were created  for panel order CBC auto differential.  Procedure                               Abnormality         Status                     ---------                               -----------         ------                     CBC with Differential[[id number]]        Abnormal            Final result                 Please view results for these tests on the individual orders.   COMPREHENSIVE METABOLIC PANEL   CBC W/ AUTO DIFFERENTIAL    Narrative:     The following orders were created for panel order CBC auto differential.  Procedure                               Abnormality         Status                     ---------                               -----------         ------                     CBC with Differential[[id number]]                                                         Please view results for these tests on the individual orders.   CBC WITH DIFFERENTIAL          Imaging Results          X-Ray Ankle Complete Left (Preliminary result)  Result time 06/27/22 02:22:50    ED Interpretation by Vinod Carl MD (06/27/22 02:22:50, [facility] - Emergency Dept, Emergency Medicine)    No fracture identified.  Significant degenerative changes.                             CT Abdomen Pelvis  Without Contrast (Preliminary result)  Result time 06/27/22 02:18:18    Preliminary result by Cole Nash MD (06/27/22 02:18:18)                 Narrative:    START OF REPORT:  Technique: CT of the abdomen and pelvis was performed with axial images as well as sagittal and coronal reconstruction images without intravenous contrast.    Comparison: None available.    Clinical History: Rt flank abd pain.    Dosage Information: Automated Exposure Control was utilized.    Findings:  Lines and Tubes: None.  Thorax:  Lungs: The visualized lung bases appear unremarkable.  Pleura: No effusions or thickening. No pneumothorax is seen.  Heart: The heart size is within normal limits. Pronounced coronary artery calcification is  seen.  Abdomen:  Abdominal Wall: No abdominal wall pathology is seen.  Liver: The liver appears unremarkable.  Biliary System: No intrahepatic or extrahepatic biliary duct dilatation is seen.  Gallbladder: Surgical clips are seen in the gallbladder fossa which may reflect prior cholecystectomy.  Pancreas: The pancreas appears unremarkable.  Spleen: The spleen appears unremarkable.  Adrenals: The adrenal glands appear unremarkable.  Kidneys: The right kidney appears unremarkable with no stones cysts masses or hydronephrosis. Tiny calculus is seen in the midpole calyx of the left kidney. The left kidneys appears otherwise unremarkable with no cysts masses or hydronephrosis.  Aorta: Extensive atheromatous wall calcification of the abdominal aorta and its branches.  Bowel:  Esophagus: The visualized esophagus appears unremarkable.  Stomach: The stomach appears unremarkable.  Duodenum: Unremarkable appearing duodenum.  Small Bowel: The small bowel appears unremarkable.  Colon: Nondistended.  Appendix: The appendix appears unremarkable.  Peritoneum: No intraperitoneal free air or ascites is seen.    Pelvis:  Bladder: The bladder is nondistended however the bladder wall appears thickened after considering state of nondistension which could reflect an element of cystitis.  Female:  Uterus: The uterus is not identified.  Ovaries: The ovaries are not identified.    Bony structures:  Dorsal Spine: There is moderate spondylosis of the visualized dorsal spine.  Bony Pelvis: The visualized bony structures of the pelvis appear unremarkable.      Impression:  1. No acute intraabdominal or pelvic solid organ or bowel pathology identified. Details and other findings as discussed above.                                   Medications   sodium chloride 0.9% flush 10 mL (has no administration in time range)   lactated ringers infusion (has no administration in time range)   enoxaparin injection 40 mg (has no administration in time range)    acetaminophen tablet 650 mg (650 mg Oral Given 6/27/22 0245)     Medical Decision Making:   Differential Diagnosis:   Nephrolithiasis, pyelonephritis, UTI, DVT, dependent edema, venous insufficiency, appendicitis, constipation, muscle strain             ED Course as of 06/27/22 0352 Mon Jun 27, 2022   0333 Family is extremely concerned secondary to patient's hallucinations.  They state that it is now causing her extreme distress as she is terrified and crying.  They state that they do not feel comfortable with her being home.  They are requesting patient to be admitted for possible psychiatric evaluation versus further testing for the hallucinations as they are in fact worsening rather than improving. [BS]   0346 Hospitalist agrees for further workup in regard states hallucinations [BS]      ED Course User Index  [BS] Vinod Carl MD             Clinical Impression:   Final diagnoses:  [M25.572] Left ankle pain  [R44.3] Hallucinations (Primary)  [R10.9] Right flank pain  [E86.0] Dehydration  [E16.2] Hypoglycemia          ED Disposition Condition    Admit               Vinod Carl MD  06/27/22 0352

## 2022-06-28 ENCOUNTER — HOSPITAL ENCOUNTER (OUTPATIENT)
Dept: RADIOLOGY | Facility: HOSPITAL | Age: 67
Discharge: HOME | End: 2022-06-28
Attending: STUDENT IN AN ORGANIZED HEALTH CARE EDUCATION/TRAINING PROGRAM
Payer: MEDICARE

## 2022-06-28 LAB
AMMONIA PLAS-MSCNC: 19.3 UMOL/L (ref 18–72)
AMPHET UR QL SCN: NEGATIVE
ANION GAP SERPL CALC-SCNC: 10 MEQ/L
BARBITURATE SCN PRESENT UR: NEGATIVE
BASOPHILS # BLD AUTO: 0.04 X10(3)/MCL (ref 0–0.2)
BASOPHILS NFR BLD AUTO: 0.4 %
BENZODIAZ UR QL SCN: NEGATIVE
BUN SERPL-MCNC: 8.3 MG/DL (ref 9.8–20.1)
CALCIUM SERPL-MCNC: 9.1 MG/DL (ref 8.4–10.2)
CANNABINOIDS UR QL SCN: NEGATIVE
CHLORIDE SERPL-SCNC: 106 MMOL/L (ref 98–107)
CO2 SERPL-SCNC: 26 MMOL/L (ref 23–31)
COCAINE UR QL SCN: NEGATIVE
CREAT SERPL-MCNC: 0.97 MG/DL (ref 0.55–1.02)
CREAT/UREA NIT SERPL: 9
DEPRECATED CALCIDIOL+CALCIFEROL SERPL-MC: 6.5 NG/ML (ref 30–80)
EOSINOPHIL # BLD AUTO: 0.08 X10(3)/MCL (ref 0–0.9)
EOSINOPHIL NFR BLD AUTO: 0.7 %
ERYTHROCYTE [DISTWIDTH] IN BLOOD BY AUTOMATED COUNT: 15.9 % (ref 11.5–17)
FENTANYL UR QL SCN: NEGATIVE
GLUCOSE SERPL-MCNC: 59 MG/DL (ref 82–115)
HCT VFR BLD AUTO: 33.8 % (ref 37–47)
HGB BLD-MCNC: 11 GM/DL (ref 12–16)
IMM GRANULOCYTES # BLD AUTO: 0.07 X10(3)/MCL (ref 0–0.02)
IMM GRANULOCYTES NFR BLD AUTO: 0.6 % (ref 0–0.43)
LYMPHOCYTES # BLD AUTO: 0.87 X10(3)/MCL (ref 0.6–4.6)
LYMPHOCYTES NFR BLD AUTO: 8 %
MCH RBC QN AUTO: 28.5 PG (ref 27–31)
MCHC RBC AUTO-ENTMCNC: 32.5 MG/DL (ref 33–36)
MCV RBC AUTO: 87.6 FL (ref 80–94)
MDMA UR QL SCN: NEGATIVE
MONOCYTES # BLD AUTO: 1.39 X10(3)/MCL (ref 0.1–1.3)
MONOCYTES NFR BLD AUTO: 12.7 %
NEUTROPHILS # BLD AUTO: 8.5 X10(3)/MCL (ref 2.1–9.2)
NEUTROPHILS NFR BLD AUTO: 77.6 %
NRBC BLD AUTO-RTO: 0 %
OPIATES UR QL SCN: NEGATIVE
PCP UR QL: NEGATIVE
PH UR: 5 [PH] (ref 3–11)
PLATELET # BLD AUTO: 248 X10(3)/MCL (ref 130–400)
PMV BLD AUTO: 11.9 FL (ref 9.4–12.4)
POCT GLUCOSE: 145 MG/DL (ref 70–110)
POTASSIUM SERPL-SCNC: 3.8 MMOL/L (ref 3.5–5.1)
RBC # BLD AUTO: 3.86 X10(6)/MCL (ref 4.2–5.4)
SODIUM SERPL-SCNC: 142 MMOL/L (ref 136–145)
SPECIFIC GRAVITY, URINE AUTO (.000) (OHS): 1.01 (ref 1–1.03)
WBC # SPEC AUTO: 10.9 X10(3)/MCL (ref 4.5–11.5)

## 2022-06-28 PROCEDURE — 63600175 PHARM REV CODE 636 W HCPCS: Performed by: INTERNAL MEDICINE

## 2022-06-28 PROCEDURE — 84591 ASSAY OF NOS VITAMIN: CPT | Performed by: STUDENT IN AN ORGANIZED HEALTH CARE EDUCATION/TRAINING PROGRAM

## 2022-06-28 PROCEDURE — 85025 COMPLETE CBC W/AUTO DIFF WBC: CPT | Performed by: INTERNAL MEDICINE

## 2022-06-28 PROCEDURE — 80048 BASIC METABOLIC PNL TOTAL CA: CPT | Performed by: INTERNAL MEDICINE

## 2022-06-28 PROCEDURE — 97162 PT EVAL MOD COMPLEX 30 MIN: CPT

## 2022-06-28 PROCEDURE — 25000003 PHARM REV CODE 250: Performed by: INTERNAL MEDICINE

## 2022-06-28 PROCEDURE — 36415 COLL VENOUS BLD VENIPUNCTURE: CPT | Performed by: INTERNAL MEDICINE

## 2022-06-28 PROCEDURE — 83921 ORGANIC ACID SINGLE QUANT: CPT | Performed by: STUDENT IN AN ORGANIZED HEALTH CARE EDUCATION/TRAINING PROGRAM

## 2022-06-28 PROCEDURE — 82140 ASSAY OF AMMONIA: CPT | Performed by: INTERNAL MEDICINE

## 2022-06-28 PROCEDURE — 11000001 HC ACUTE MED/SURG PRIVATE ROOM

## 2022-06-28 PROCEDURE — 70450 CT HEAD/BRAIN W/O DYE: CPT | Mod: TC

## 2022-06-28 PROCEDURE — 80307 DRUG TEST PRSMV CHEM ANLYZR: CPT | Performed by: STUDENT IN AN ORGANIZED HEALTH CARE EDUCATION/TRAINING PROGRAM

## 2022-06-28 PROCEDURE — 25000003 PHARM REV CODE 250: Performed by: STUDENT IN AN ORGANIZED HEALTH CARE EDUCATION/TRAINING PROGRAM

## 2022-06-28 PROCEDURE — 36415 COLL VENOUS BLD VENIPUNCTURE: CPT | Performed by: STUDENT IN AN ORGANIZED HEALTH CARE EDUCATION/TRAINING PROGRAM

## 2022-06-28 PROCEDURE — 82306 VITAMIN D 25 HYDROXY: CPT | Performed by: STUDENT IN AN ORGANIZED HEALTH CARE EDUCATION/TRAINING PROGRAM

## 2022-06-28 PROCEDURE — 25000242 PHARM REV CODE 250 ALT 637 W/ HCPCS: Performed by: INTERNAL MEDICINE

## 2022-06-28 RX ORDER — INDOMETHACIN 75 MG/1
75 CAPSULE, EXTENDED RELEASE ORAL 2 TIMES DAILY WITH MEALS
Status: DISCONTINUED | OUTPATIENT
Start: 2022-06-28 | End: 2022-06-30

## 2022-06-28 RX ADMIN — FLUTICASONE PROPIONATE 50 MCG: 50 SPRAY, METERED NASAL at 08:06

## 2022-06-28 RX ADMIN — POTASSIUM BICARBONATE 25 MEQ: 977.5 TABLET, EFFERVESCENT ORAL at 08:06

## 2022-06-28 RX ADMIN — ISOSORBIDE MONONITRATE 30 MG: 30 TABLET, EXTENDED RELEASE ORAL at 08:06

## 2022-06-28 RX ADMIN — SULFAMETHOXAZOLE AND TRIMETHOPRIM 1 TABLET: 800; 160 TABLET ORAL at 07:06

## 2022-06-28 RX ADMIN — TORSEMIDE 20 MG: 20 TABLET ORAL at 07:06

## 2022-06-28 RX ADMIN — INDOMETHACIN 75 MG: 75 CAPSULE, EXTENDED RELEASE ORAL at 08:06

## 2022-06-28 RX ADMIN — PANTOPRAZOLE SODIUM 40 MG: 40 TABLET, DELAYED RELEASE ORAL at 07:06

## 2022-06-28 RX ADMIN — HYDROCHLOROTHIAZIDE 25 MG: 25 TABLET ORAL at 08:06

## 2022-06-28 RX ADMIN — Medication 1 EACH: at 07:06

## 2022-06-28 RX ADMIN — LEVOTHYROXINE SODIUM 100 MCG: 100 TABLET ORAL at 05:06

## 2022-06-28 RX ADMIN — ATORVASTATIN CALCIUM 20 MG: 10 TABLET, FILM COATED ORAL at 08:06

## 2022-06-28 RX ADMIN — LISINOPRIL 20 MG: 20 TABLET ORAL at 07:06

## 2022-06-28 RX ADMIN — ACETAMINOPHEN 650 MG: 325 TABLET, FILM COATED ORAL at 07:06

## 2022-06-28 RX ADMIN — ASPIRIN 325 MG ORAL TABLET 325 MG: 325 PILL ORAL at 07:06

## 2022-06-28 RX ADMIN — CHOLESTYRAMINE 4 G: 4 POWDER, FOR SUSPENSION ORAL at 07:06

## 2022-06-28 RX ADMIN — INDOMETHACIN 75 MG: 75 CAPSULE, EXTENDED RELEASE ORAL at 04:06

## 2022-06-28 RX ADMIN — ENOXAPARIN SODIUM 40 MG: 40 INJECTION SUBCUTANEOUS at 04:06

## 2022-06-28 NOTE — PLAN OF CARE
06/28/22 1453   Discharge Assessment   Assessment Type Discharge Planning Assessment   Confirmed/corrected address, phone number and insurance Yes   Confirmed Demographics Correct on Facesheet   Source of Information patient   When was your last doctors appointment?   (was suppossed to see this week but ended up in ED)   Communicated PAPO with patient/caregiver Date not available/Unable to determine   Reason For Admission hallucinations   Lives With grandchild(timi)   Do you expect to return to your current living situation? Yes   Do you have help at home or someone to help you manage your care at home? No   Prior to hospitilization cognitive status: Alert/Oriented   Current cognitive status: Alert/Oriented   Walking or Climbing Stairs Difficulty none   Dressing/Bathing Difficulty none   Equipment Currently Used at Home none   Readmission within 30 days? Yes   Patient currently being followed by outpatient case management? No   Do you currently have service(s) that help you manage your care at home? No   Do you take prescription medications? Yes   Do you have prescription coverage? Yes   Coverage Medicare   Do you have any problems affording any of your prescribed medications? No   Is the patient taking medications as prescribed? yes   Who is going to help you get home at discharge? Daughter Stephanie   How do you get to doctors appointments? family or friend will provide   Are you on dialysis? No   Do you take coumadin? No   Discharge Plan A Home   Discharge Plan B Home   DME Needed Upon Discharge  none   Discharge Plan discussed with: Patient;Adult children   Discharge Barriers Identified None

## 2022-06-28 NOTE — PT/OT/SLP EVAL
"Physical Therapy Evaluation    Patient Name:  Lila Carmona   MRN:  [id number]    Recommendations:     Discharge Recommendations:    pending  Discharge Equipment Recommendations: walker, rolling   Barriers to discharge: decreased functional mobility/independence    Assessment:     Lila Carmona is a 67 y.o. female admitted with a medical diagnosis of Hallucinations.  She presents with the following impairments/functional limitations:  weakness, gait instability, impaired endurance, impaired balance, decreased lower extremity function, decreased safety awareness, impaired functional mobilty, impaired self care skills.    Patient tolerated PT eval well. Mobilizing with Bakari overall for sit<>stand and gait with use of RW. Would benefit from a few additional PT sessions in order to ensure safety with mobility.     Rehab Prognosis: Good; patient would benefit from acute skilled PT services to address these deficits and reach maximum level of function.    Recent Surgery: * No surgery found *      Plan:     During this hospitalization, patient to be seen 5 x/week to address the identified rehab impairments via gait training, therapeutic activities, therapeutic exercises and progress toward the following goals:    · Plan of Care Expires:  07/28/22    Subjective     Chief Complaint: L ankle swelling and pain- reports that she has had "flare ups" in the past similar to this  Patient/Family Comments/goals: to get better and stop seeing things  Pain/Comfort:  · Pain Rating 1: 7/10  · Location - Side 1: Left  · Location 1: ankle    Patients cultural, spiritual, Mu-ism conflicts given the current situation: no    Living Environment:  Pt reports living with her 16yo grandchild  Prior to admission, patients level of function was independent.    Equipment used at home: cane, straight.  DME owned (not currently used): single point cane.    Upon discharge, patient will have assistance from family.    Objective:     Communicated " with NSG prior to session.  Patient found HOB elevated with    upon PT entry to room.    General Precautions: Standard, fall   Orthopedic Precautions:N/A   Braces: N/A  Respiratory Status: Room air    Exams:  · Cognitive Exam:  Patient is oriented to Person and Place  · RLE Strength: WFL  · LLE Strength: grossly 5/5; ankle not tested 2/2 swelling and pain    Functional Mobility:  · Bed Mobility:     · Supine to Sit: independence  · Sit to Supine: independence  · Transfers:     · Sit to Stand:  contact guard assistance with rolling walker  · Gait: pt was able to successfully take side steps near EOB so then she performed 8 feet fwd/bkwd; quick step of R LE 2/2 unable to tolerate WB on L LE; small step to pattern; no LOB; Bakari for safety and RW used; educated on proper management of AD      Patient left HOB elevated with all lines intact, call button in reach and psych consult rep present.    GOALS:   Multidisciplinary Problems     Physical Therapy Goals        Problem: Physical Therapy    Goal Priority Disciplines Outcome Goal Variances Interventions   Physical Therapy Goal     PT, PT/OT Ongoing, Progressing     Description: Goals to be met by: 22     Patient will increase functional independence with mobility by performin. Sit to stand transfer with Modified Kleberg  2. Gait  x 200 feet with Modified Kleberg using Rolling Walker.                      History:     Past Medical History:   Diagnosis Date    Coronary artery disease     Hypertension     Thyroid disease        Past Surgical History:   Procedure Laterality Date    CORONARY ARTERY BYPASS GRAFT (CABG)         Time Tracking:     PT Received On: 22  PT Start Time: 1550     PT Stop Time: 1600  PT Total Time (min): 10 min     Billable Minutes: Evaluation 10      2022

## 2022-06-28 NOTE — PLAN OF CARE
Problem: Physical Therapy  Goal: Physical Therapy Goal  Description: Goals to be met by: 22     Patient will increase functional independence with mobility by performin. Sit to stand transfer with Modified Etters  2. Gait  x 200 feet with Modified Etters using Rolling Walker.     Outcome: Ongoing, Progressing

## 2022-06-28 NOTE — PROGRESS NOTES
[facility] - 5th Floor Med Surg  Adult Nutrition  Progress Note    SUMMARY       Recommendations    Recommendation/Intervention:   1. Continue regular diet as ordered and tolerated.   2. Add strawberry Boost Plus to trial (360kcals, 14g protein per serving).   3. Continue questran.   4. May benefit from appetite stimulant if inadequate po intake persists.      Assessment and Plan   Nutrition Problem  Malnutrition    Related to (etiology):   Inadequate energy intake    Signs and Symptoms (as evidenced by):   <75% EER x1 month, >20% wt loss x1 year, physical evidence of muscle and fat loss    Interventions(treatment strategy):  General / Healthful Diet, Commercial beverage, Prescription Medication and Collaboration with other providers    Nutrition Diagnosis Status:   New    Goals: 1. Meet >75% nutritional needs by follow-up. 2. Maintain wt throughout hospitalization.  Nutrition Goal Status: new      Malnutrition Assessment  Malnutrition in the context of chronic illness    Degree of Malnutrition:  Severe Malnutrition  Energy Intake:  < 75% of estimated energy requirement for >/= 1 month  Interpretation of Weight Loss:  >20% in 1 year  Body Fat: mild depletion  Area of Body Fat Loss:  orbital region  and upper arm region - triceps / biceps  Muscle Mass Loss:  severe depletion  Area of Muscle Mass Loss: temple region - temporalis muscle and clavicle bone region - pectoralis major, deltoid, trapezius muscles  Fluid Accumulation:  mild  Edema: 1+ edema - trace and 2+ edema - mild  Reduced  Strength:  not applicable    A minimum of two characteristics is recommended for diagnosis of either severe or non-severe malnutrition.          Reason for Assessment    Reason For Assessment: identified at risk by screening criteria  Diagnosis:    Visual Hallucinations    Recent Ecoli UTI    HTN  Relevant Medical History: CAD, HTN, thyroid disease    General Information Comments:  6/28: Pt seen for MST score. She  "does not eat much, small bites of meals and states since radiation (?) food tastes different. Reports 100lb wt loss x1 year. Pt takes questran with meals, continues with complaints of "food going straight through" her at times. Daughter wants to add oral supplement for pt to try, will add and monitor tolerance. May benefit from appetite stimulant if inadequate po intake persists.    Nutrition Risk Screen    Nutrition Risk Screen: no indicators present    Nutrition/Diet History    Factors Affecting Nutritional Intake: diarrhea, abdominal pain    Anthropometrics    Temp: 97.4 °F (36.3 °C)  Height Method: Stated  Height: 5' 4.02" (162.6 cm)  Height (inches): 64.02 in  Weight Method: Stated  Weight: 63.5 kg (140 lb)  Weight (lb): 140 lb  Ideal Body Weight (IBW), Female: 120.1 lb  % Ideal Body Weight, Female (lb): 116.57 %  BMI (Calculated): 24  Usual Body Weight (UBW), k.9 kg  Weight Change Amount: 115 lb (reported x1 year)  % Usual Body Weight: 54.91  % Weight Change From Usual Weight: -45.21 %       Lab/Procedures/Meds    Pertinent Labs Reviewed: reviewed  Pertinent Labs Comments: : H/H 11/33.8L, Gluc 59L  Pertinent Medications Reviewed: reviewed  Pertinent Medications Comments: cholestyramine, ferrous sulfate, HCTZ, levothyroxine, pantoprazole, KCl, prednisone, torsemide    Estimated/Assessed Needs    Weight Used For Calorie Calculations: 63.5 kg (139 lb 15.9 oz)  Energy Calorie Requirements (kcal): 1587-1905kcals/d (25-30kcals/kg)  Energy Need Method: Kcal/kg  Protein Requirements: 76g/d (1.2g/kg)  Weight Used For Protein Calculations: 63.5 kg (139 lb 15.9 oz)  Fluid Requirements (mL): 1587ml fl/d  Estimated Fluid Requirement Method: RDA Method  RDA Method (mL): 1587     Nutrition Prescription Ordered    Current Diet Order: regular    Evaluation of Received Nutrient/Fluid Intake    Energy Calories Required: not meeting needs  Protein Required: not meeting needs  Tolerance: tolerating  % Intake of " Estimated Energy Needs: 25 - 50 %  % Meal Intake: 25 - 50 %    Nutrition Risk    Level of Risk/Frequency of Follow-up: moderate     Monitor and Evaluation    Food and Nutrient Intake: energy intake, food and beverage intake  Anthropometric Measurements: weight change  Biochemical Data, Medical Tests and Procedures: glucose/endocrine profile, electrolyte and renal panel     Nutrition Follow-Up    RD Follow-up?: Yes

## 2022-06-28 NOTE — PROGRESS NOTES
[facility]  Hospital Medicine Progress Note        Chief Complaint: halluicination.     HPI:   67 y.o. female who  has a past medical history of Coronary artery disease, Hypertension, and Thyroid disease.. The patient presented to [facility] on 6/27/2022 with a primary complaint of hallucinations.     She was just discharged yesterday from the hospital after admission for UTI and hallucinations. She says the visual hallucinations worsened after she went home. She sees people or animals. She also had right lower quadrant/groin and right lower flank pain. She denies having hallucinations prior to a week ago. Denies hx of depression, anxiety or other psychiatric diagnoses. No family is at bedside for additional history. She reports having mild forgetfulness at times. She denies being officially diagnosed with dementia.    Interval Hx:   Daughter present at bedside-hallucinations started after gabapentin.     Objective/physical exam:  General: Appears comfortable, no acute distress.  Integumentary: Warm, dry, intact.  Musculoskeletal: Purposeful movement noted.     Respiratory: No accessory muscle use. Breath sounds are equal.  Cardiovascular: Regular rate. No peripheral edema.    VITAL SIGNS: 24 HRS MIN & MAX LAST   Temp  Min: 98 °F (36.7 °C)  Max: 99.9 °F (37.7 °C) 98 °F (36.7 °C)   BP  Min: 102/61  Max: 149/68 102/61   Pulse  Min: 60  Max: 99  77   Resp  Min: 18  Max: 18 18   SpO2  Min: 94 %  Max: 99 % 98 %     CT Abdomen Pelvis  Without Contrast  Narrative: EXAMINATION:  CT ABDOMEN PELVIS WITHOUT CONTRAST    CLINICAL HISTORY:  Flank pain, kidney stone suspected;    TECHNIQUE:  Helical acquisition through the abdomen and pelvis without IV contrast.  Lack of contrast limits evaluation of solid organs and vascular structures .  Three plane reconstructions were provided for review.  mGycm. Automatic exposure control, adjustment of mA/kV or iterative reconstruction technique was used to  reduce radiation.    COMPARISON:  No priors    FINDINGS:  The limited imaged lung bases are clear.  There are coronary artery calcifications.    Noncontrast liver, spleen, pancreas and adrenals are within normal limits.  Gallbladder surgically absent.  No hydronephrosis.  There is a small nonobstructing calculus left kidney.    No bowel obstruction. No significant inflammatory changes of the bowel.  Normal appendix.  No free air.    Urinary bladder is not well distended.  No pelvic free fluid.  No adnexal mass.  The abdominal aorta is normal in caliber.  There is dense atherosclerotic disease.    Moderate degenerative change of the spine.  Impression: No acute abdominopelvic findings on this noncontrast scan.    No significant discrepancy with the preliminary report.    Electronically signed by: Marcos Holliday  Date:    06/27/2022  Time:    07:27  X-Ray Ankle Complete Left  Narrative: EXAMINATION:  Three radiographic views of the LEFT ANKLE.    CLINICAL HISTORY:  Pain in left ankle and joints of left foot    TECHNIQUE:  Three radiographic views of the LEFT ANKLE.    COMPARISON:  None.    FINDINGS:  Three views of the left ankle demonstrate normal alignment.  There is no fracture.  There is no bony mass lesion.  There is diffuse soft tissue swelling.  Impression: Diffuse soft tissue swelling without underlying osseous abnormality.    Electronically signed by: Lan Diamond MD  Date:    06/27/2022  Time:    06:55    Recent Labs   Lab 06/27/22  0150 06/27/22  0555 06/28/22  0447   WBC 11.6* 8.9 10.9   RBC 4.35 3.86* 3.86*   HGB 12.6 11.3* 11.0*   HCT 38.4 33.8* 33.8*   MCV 88.3 87.6 87.6   MCH 29.0 29.3 28.5   MCHC 32.8* 33.4 32.5*   RDW 15.6 15.8 15.9    255 248   MPV 11.6 11.4 11.9       Recent Labs   Lab 06/26/22  0457 06/27/22  0150 06/27/22  0555 06/28/22  0447    141 143 142   K 3.7 3.7 3.5 3.8   CO2 25 26 31 26   BUN 12.4 9.8 9.9 8.3*   CREATININE 1.08* 1.28* 1.08* 0.97   CALCIUM 8.5 10.1 9.8 9.1    ALBUMIN 2.3* 3.3* 2.8*  --    ALKPHOS 44 58 49  --    ALT 7 11 10  --    AST 16 23 19  --    BILITOT 0.5 0.5 0.4  --           Microbiology Results (last 7 days)     ** No results found for the last 168 hours. **           See below for Radiology    Scheduled Med:   aspirin  325 mg Oral Daily    atorvastatin  20 mg Oral QHS    cholestyramine  1 packet Oral Daily    digoxin  0.25 mg Oral Daily    enoxaparin  40 mg Subcutaneous Daily    ferrous sulfate  1 tablet Oral Daily    fluticasone propionate  1 spray Each Nostril Daily    hydroCHLOROthiazide  25 mg Oral Daily    isosorbide mononitrate  30 mg Oral Daily    levothyroxine  100 mcg Oral Before breakfast    lisinopriL  20 mg Oral Daily    metoprolol succinate  12.5 mg Oral Daily    pantoprazole  40 mg Oral Daily    potassium bicarbonate  25 mEq Oral Daily    predniSONE  20 mg Oral Daily    sulfamethoxazole-trimethoprim 800-160mg  1 tablet Oral BID    torsemide  20 mg Oral Daily      PRN Meds:  acetaminophen, albuterol, sodium chloride 0.9%, sodium chloride 0.9%     Nutrition Status:  Regular diet as tolerated.     Assessment/Plan:  Visual Hallucinations  Recently coli UTI status post treatment  Hypertension-controlled      Plan  Visual hallucinations history of underlying psychiatric illness, started after gabapentin which has been discontinued.no neurological deficits appreciated.   CT head pending. No electrolyte disturbances.   Order UDC. Steriods can very well cause hallucinations, patient/family is unsure how long she has been on this medication and the reasoning, for that reason I will not discontinue at this time but she will likely need a taper.   Check vitamin levels-b12 thiamine, niacin level.   pysch to evaluate. Check TSH    Left ankle pain-x-ray without acute fracture., continue supportive care, weightbear as tolerated.history of gout, likely gout, started indomethacin.   Request records from GI physician.      Anticipated discharge  and Disposition:  Pending.     All diagnosis and differential diagnosis have been reviewed; assessment and plan has been documented; I have personally reviewed the labs and test results that are presently available; I have reviewed the patients medication list; I have reviewed the consulting providers response and recommendations. I have reviewed or attempted to review medical records based upon their availability    All of the patient's questions have been  addressed and answered. Patient's is agreeable to the above stated plan.   I will continue to monitor closely and make adjustments to medical management as needed.          Zandra Ureña,    06/28/2022        This note was created with the assistance of Dragon voice recognition software. There may be transcription errors as a result of using this technology however minimal. Effort has been made to assure accuracy of transcription but any obvious errors or omissions should be clarified with the author of the document.

## 2022-06-29 LAB
ANION GAP SERPL CALC-SCNC: 9 MEQ/L
BACTERIA BLD CULT: NORMAL
BACTERIA BLD CULT: NORMAL
BASOPHILS # BLD AUTO: 0.02 X10(3)/MCL (ref 0–0.2)
BASOPHILS NFR BLD AUTO: 0.2 %
BUN SERPL-MCNC: 17.7 MG/DL (ref 9.8–20.1)
CALCIUM SERPL-MCNC: 9.3 MG/DL (ref 8.4–10.2)
CHLORIDE SERPL-SCNC: 104 MMOL/L (ref 98–107)
CO2 SERPL-SCNC: 26 MMOL/L (ref 23–31)
CREAT SERPL-MCNC: 1.82 MG/DL (ref 0.55–1.02)
CREAT UR-MCNC: 95.9 MG/DL (ref 47–110)
CREAT/UREA NIT SERPL: 10
EOSINOPHIL # BLD AUTO: 0.02 X10(3)/MCL (ref 0–0.9)
EOSINOPHIL NFR BLD AUTO: 0.2 %
ERYTHROCYTE [DISTWIDTH] IN BLOOD BY AUTOMATED COUNT: 16.3 % (ref 11.5–17)
GLUCOSE SERPL-MCNC: 107 MG/DL (ref 82–115)
HCT VFR BLD AUTO: 33.1 % (ref 37–47)
HGB BLD-MCNC: 10.5 GM/DL (ref 12–16)
IMM GRANULOCYTES # BLD AUTO: 0.06 X10(3)/MCL (ref 0–0.02)
IMM GRANULOCYTES NFR BLD AUTO: 0.5 % (ref 0–0.43)
LYMPHOCYTES # BLD AUTO: 0.84 X10(3)/MCL (ref 0.6–4.6)
LYMPHOCYTES NFR BLD AUTO: 6.7 %
MAGNESIUM SERPL-MCNC: 1.5 MG/DL (ref 1.6–2.6)
MCH RBC QN AUTO: 28.8 PG (ref 27–31)
MCHC RBC AUTO-ENTMCNC: 31.7 MG/DL (ref 33–36)
MCV RBC AUTO: 90.7 FL (ref 80–94)
MONOCYTES # BLD AUTO: 1.39 X10(3)/MCL (ref 0.1–1.3)
MONOCYTES NFR BLD AUTO: 11 %
NEUTROPHILS # BLD AUTO: 10.3 X10(3)/MCL (ref 2.1–9.2)
NEUTROPHILS NFR BLD AUTO: 81.4 %
NRBC BLD AUTO-RTO: 0 %
PHOSPHATE SERPL-MCNC: 2.9 MG/DL (ref 2.3–4.7)
PLATELET # BLD AUTO: 222 X10(3)/MCL (ref 130–400)
PMV BLD AUTO: 11.9 FL (ref 9.4–12.4)
POCT GLUCOSE: 107 MG/DL (ref 70–110)
POCT GLUCOSE: 139 MG/DL (ref 70–110)
POTASSIUM SERPL-SCNC: 3.8 MMOL/L (ref 3.5–5.1)
RBC # BLD AUTO: 3.65 X10(6)/MCL (ref 4.2–5.4)
SODIUM SERPL-SCNC: 139 MMOL/L (ref 136–145)
SODIUM UR-SCNC: 74 MMOL/L
TSH SERPL-ACNC: 1.49 UIU/ML (ref 0.35–4.94)
WBC # SPEC AUTO: 12.6 X10(3)/MCL (ref 4.5–11.5)

## 2022-06-29 PROCEDURE — 36415 COLL VENOUS BLD VENIPUNCTURE: CPT | Performed by: STUDENT IN AN ORGANIZED HEALTH CARE EDUCATION/TRAINING PROGRAM

## 2022-06-29 PROCEDURE — 36415 COLL VENOUS BLD VENIPUNCTURE: CPT | Performed by: INTERNAL MEDICINE

## 2022-06-29 PROCEDURE — 11000001 HC ACUTE MED/SURG PRIVATE ROOM

## 2022-06-29 PROCEDURE — 25000003 PHARM REV CODE 250: Performed by: INTERNAL MEDICINE

## 2022-06-29 PROCEDURE — 84100 ASSAY OF PHOSPHORUS: CPT | Performed by: STUDENT IN AN ORGANIZED HEALTH CARE EDUCATION/TRAINING PROGRAM

## 2022-06-29 PROCEDURE — 83735 ASSAY OF MAGNESIUM: CPT | Performed by: STUDENT IN AN ORGANIZED HEALTH CARE EDUCATION/TRAINING PROGRAM

## 2022-06-29 PROCEDURE — 97110 THERAPEUTIC EXERCISES: CPT | Mod: CQ

## 2022-06-29 PROCEDURE — 63600175 PHARM REV CODE 636 W HCPCS: Performed by: INTERNAL MEDICINE

## 2022-06-29 PROCEDURE — 97116 GAIT TRAINING THERAPY: CPT | Mod: CQ

## 2022-06-29 PROCEDURE — 63600175 PHARM REV CODE 636 W HCPCS: Performed by: STUDENT IN AN ORGANIZED HEALTH CARE EDUCATION/TRAINING PROGRAM

## 2022-06-29 PROCEDURE — 84300 ASSAY OF URINE SODIUM: CPT | Performed by: STUDENT IN AN ORGANIZED HEALTH CARE EDUCATION/TRAINING PROGRAM

## 2022-06-29 PROCEDURE — 25000003 PHARM REV CODE 250: Performed by: STUDENT IN AN ORGANIZED HEALTH CARE EDUCATION/TRAINING PROGRAM

## 2022-06-29 PROCEDURE — 82570 ASSAY OF URINE CREATININE: CPT | Performed by: STUDENT IN AN ORGANIZED HEALTH CARE EDUCATION/TRAINING PROGRAM

## 2022-06-29 PROCEDURE — 85025 COMPLETE CBC W/AUTO DIFF WBC: CPT | Performed by: INTERNAL MEDICINE

## 2022-06-29 PROCEDURE — 84443 ASSAY THYROID STIM HORMONE: CPT | Performed by: STUDENT IN AN ORGANIZED HEALTH CARE EDUCATION/TRAINING PROGRAM

## 2022-06-29 PROCEDURE — 80048 BASIC METABOLIC PNL TOTAL CA: CPT | Performed by: STUDENT IN AN ORGANIZED HEALTH CARE EDUCATION/TRAINING PROGRAM

## 2022-06-29 RX ORDER — CHOLECALCIFEROL (VITAMIN D3) 25 MCG
1000 TABLET ORAL DAILY
Status: DISCONTINUED | OUTPATIENT
Start: 2022-06-29 | End: 2022-07-03 | Stop reason: HOSPADM

## 2022-06-29 RX ORDER — MAGNESIUM SULFATE HEPTAHYDRATE 40 MG/ML
2 INJECTION, SOLUTION INTRAVENOUS ONCE
Status: COMPLETED | OUTPATIENT
Start: 2022-06-29 | End: 2022-06-29

## 2022-06-29 RX ADMIN — PREDNISONE 20 MG: 20 TABLET ORAL at 08:06

## 2022-06-29 RX ADMIN — PANTOPRAZOLE SODIUM 40 MG: 40 TABLET, DELAYED RELEASE ORAL at 08:06

## 2022-06-29 RX ADMIN — Medication 1 EACH: at 09:06

## 2022-06-29 RX ADMIN — ISOSORBIDE MONONITRATE 30 MG: 30 TABLET, EXTENDED RELEASE ORAL at 08:06

## 2022-06-29 RX ADMIN — POTASSIUM BICARBONATE 25 MEQ: 977.5 TABLET, EFFERVESCENT ORAL at 08:06

## 2022-06-29 RX ADMIN — CHOLECALCIFEROL TAB 25 MCG (1000 UNIT) 1000 UNITS: 25 TAB at 03:06

## 2022-06-29 RX ADMIN — LEVOTHYROXINE SODIUM 100 MCG: 100 TABLET ORAL at 05:06

## 2022-06-29 RX ADMIN — CHOLESTYRAMINE 4 G: 4 POWDER, FOR SUSPENSION ORAL at 08:06

## 2022-06-29 RX ADMIN — ENOXAPARIN SODIUM 40 MG: 40 INJECTION SUBCUTANEOUS at 04:06

## 2022-06-29 RX ADMIN — MAGNESIUM SULFATE HEPTAHYDRATE 2 G: 40 INJECTION, SOLUTION INTRAVENOUS at 03:06

## 2022-06-29 RX ADMIN — FLUTICASONE PROPIONATE 50 MCG: 50 SPRAY, METERED NASAL at 08:06

## 2022-06-29 RX ADMIN — INDOMETHACIN 75 MG: 75 CAPSULE, EXTENDED RELEASE ORAL at 07:06

## 2022-06-29 RX ADMIN — TORSEMIDE 20 MG: 20 TABLET ORAL at 08:06

## 2022-06-29 RX ADMIN — INDOMETHACIN 75 MG: 75 CAPSULE, EXTENDED RELEASE ORAL at 04:06

## 2022-06-29 RX ADMIN — HYDROCHLOROTHIAZIDE 25 MG: 25 TABLET ORAL at 08:06

## 2022-06-29 RX ADMIN — ASPIRIN 325 MG ORAL TABLET 325 MG: 325 PILL ORAL at 08:06

## 2022-06-29 RX ADMIN — LISINOPRIL 20 MG: 20 TABLET ORAL at 08:06

## 2022-06-29 RX ADMIN — ATORVASTATIN CALCIUM 20 MG: 10 TABLET, FILM COATED ORAL at 09:06

## 2022-06-29 NOTE — PT/OT/SLP PROGRESS
Physical Therapy  Treatment    Lila Carmona   MRN: [id number]   Admitting Diagnosis: Hallucinations       PT Start Time: 1420     PT Stop Time: 1444    PT Total Time (min): 24 min       Billable Minutes:  Gait Training 12 and Therapeutic Exercise 12    Treatment Type: Treatment  PT/PTA: PTA     PTA Visit Number: 1       General Precautions: Standard, fall  Orthopedic Precautions: N/A   Braces:    Respiratory Status: Room air    Spiritual, Cultural Beliefs, Methodist Practices, Values that Affect Care: no    Subjective:  Communicated with NSG prior to session.           Objective:        Functional Mobility:  Bed Mobility:    Mod I    Transfers:   SBA. Sit to/from stand t/f    Gait:    Pt ambulated ~450ft without AD. Slow but steady step through gait pattern. Decreased step length and hellen. Pt using the railing in the hallway at times. No overt LOB noted t/o.    Therapeutic Activities and Exercises:  SEAN LE therex     AM-PAC 6 CLICK MOBILITY  How much help from another person does this patient currently need?   1 = Unable, Total/Dependent Assistance  2 = A lot, Maximum/Moderate Assistance  3 = A little, Minimum/Contact Guard/Supervision  4 = None, Modified [place]/Independent         AM-PAC Raw Score CMS G-Code Modifier Level of Impairment Assistance   6 % Total / Unable   7 - 9 CM 80 - 100% Maximal Assist   10 - 14 CL 60 - 80% Moderate Assist   15 - 19 CK 40 - 60% Moderate Assist   20 - 22 CJ 20 - 40% Minimal Assist   23 CI 1-20% SBA / CGA   24 CH 0% Independent/ Mod I     Patient left up in chair with all lines intact.    Assessment:  Lila Carmona is a 67 y.o. female with a medical diagnosis of Hallucinations.    Rehab identified problem list/impairments:      Rehab potential is excellent.    Activity tolerance: Excellent    Discharge recommendations:       Barriers to discharge:      Equipment recommendations:       GOALS:   Multidisciplinary Problems     Physical Therapy Goals        Problem:  Physical Therapy    Goal Priority Disciplines Outcome Goal Variances Interventions   Physical Therapy Goal     PT, PT/OT Ongoing, Progressing     Description: Goals to be met by: 22     Patient will increase functional independence with mobility by performin. Sit to stand transfer with Modified [place]  2. Gait  x 200 feet with Modified [place] using Rolling Walker.                      PLAN:    Patient to be seen 5 x/week  to address the above listed problems via gait training, therapeutic activities, therapeutic exercises  Plan of Care expires: 22  Plan of Care reviewed with: patient         2022

## 2022-06-29 NOTE — ASSESSMENT & PLAN NOTE
Psychiatry Recommendations:  1.  No medication recommendations at this time.  2.  Rule out medical causes.  I have concerns about steroid use and rapid changes in hemodynamics.  3.  If able to medically wean her off prednisone at this time, I recommend doing so to assess if hallucinations will completely resolve.  She seems to have experienced a decrease in hallucination episodes.  Blood count should continue to be monitored as I have concerns over rapid decrease in H & H.  4.  Re-consult psych if needed

## 2022-06-29 NOTE — HPI
"Ms. Carmona is a 67 year old AA female admitted to the hospital for visual hallucinations that started last Wednesday.  Her daughter, Mj Burnett, says that her mom was diagnosed with a UTI last Thursday.  She was started on Bactrim for the UTI.  Ms. Carmona reports that visual hallucinations started all of a sudden last Wednesday.  Ms. Carmona reports that it started off with her seeing some random people and also some  people that she actually knew.  She says that they were not trying to talk to her but they were just there with her outside.  She and her daughter reports that the visual hallucinations got worse as time progressed so they brought her into the hospital because it was more concerning.  Ms. Carmona says that it was scary for her because she knows that it is not real but it feels so real.  She reports that she only had one episode of visual hallucinations on today when she saw a little man with a beard in her room.  On yesterday, she experienced a first episode of tactile hallucination where she saw one bug and that turned into a lot of bugs and she was trying to get them off her.  Her daughter said that there were no bugs but they "helped" her dust them off because they knew nothing of what else to do.    Ms. Carmona denies current depression or anxiety.  She says that she does wonder why the hallucinations are occurring.  She denies a hx of depression, anxiety, AVH , or delusional thinking.  She reports a hx of sleep apnea and wears CPAP at home and states that she gets about 4.5 hours of sleep a night.  She says that she does not take naps and she also reports that she feels well rested the next day.  About 3-5 days before the hallucinations started, Ms. Carmona and her daughter said that she was started on gabapentin and had about 5 doses.  She also reports that she has been on oral steroids for about the last three weeks that her PCP, Dr. Washburn, started her on due to gout in her lower " extremity.  Ms. Carmona reports a daily hx of watery diarrhea for years.  She says that she does not like going anywhere because whatever she takes in will immediately come out.  Her GI doctor is Dr. Hernandez and she has been seeing him since about 2019 and they still do not know what is causing her symptoms.  Her daughter says that her last colonoscopy was maybe in 2019.      Brain MRI from 6/24/22 shows no acute intracranial findings and minimal chronic small vessel ischemic changes.  On 6/23/22, anemia was noted, but on 6/25/22, she had a significant decline in her H & H that required a blood transfusion.  Hemostais occurred as of yesterday to normal but the second CBC showed slight anemia.

## 2022-06-29 NOTE — CONSULTS
"[facility] - 5th Floor Med Surg  Psychiatry  Consult Note    Patient Name: Lila Carmona  MRN: [id number]   Code Status: Full Code  Admission Date: 2022  Hospital Length of Stay: 1 days  Attending Physician: Gordon Chin MD  Primary Care Provider: Delma Washburn MD    Current Legal Status: Uncontested    Patient information was obtained from patient, relative(s), ER records and nurse.   Inpatient consult to Psychiatry  Consult performed by: GABRIELLE Melo  Consult ordered by: Rogers Scherer MD  Assessment/Recommendations: Psychiatry Recommendations:  1.  No medication recommendations at this time.  2.  Rule out medical causes.  I have concerns about steroid use and rapid changes in hemodynamics.  3.  If able to medically wean her off prednisone at this time, I recommend doing so to assess if hallucinations will completely resolve.  She seems to have experienced a decrease in hallucination episodes.  Blood count should continue to be monitored as I have concerns over rapid decrease in H & H.  4.  Re-consult psych if needed        Subjective:     Principal Problem:Hallucinations    Chief Complaint:  "I have been seeing things that are not really there."     HPI: Ms. Carmona is a 67 year old AA female admitted to the hospital for visual hallucinations that started last Wednesday.  Her daughter, Mj Burnett, says that her mom was diagnosed with a UTI last Thursday.  She was started on Bactrim for the UTI.  Ms. Carmona reports that visual hallucinations started all of a sudden last Wednesday.  Ms. Carmona reports that it started off with her seeing some random people and also some  people that she actually knew.  She says that they were not trying to talk to her but they were just there with her outside.  She and her daughter reports that the visual hallucinations got worse as time progressed so they brought her into the hospital because it was more concerning.  Ms. Carmona " "says that it was scary for her because she knows that it is not real but it feels so real.  She reports that she only had one episode of visual hallucinations on today when she saw a little man with a beard in her room.  On yesterday, she experienced a first episode of tactile hallucination where she saw one bug and that turned into a lot of bugs and she was trying to get them off her.  Her daughter said that there were no bugs but they "helped" her dust them off because they knew nothing of what else to do.    Ms. Carmona denies current depression or anxiety.  She says that she does wonder why the hallucinations are occurring.  She denies a hx of depression, anxiety, AVH , or delusional thinking.  She reports a hx of sleep apnea and wears CPAP at home and states that she gets about 4.5 hours of sleep a night.  She says that she does not take naps and she also reports that she feels well rested the next day.  About 3-5 days before the hallucinations started, Ms. Carmona and her daughter said that she was started on gabapentin and had about 5 doses.  She also reports that she has been on oral steroids for about the last three weeks that her PCP, Dr. Washburn, started her on due to gout in her lower extremity.  Ms. Carmona reports a daily hx of watery diarrhea for years.  She says that she does not like going anywhere because whatever she takes in will immediately come out.  Her GI doctor is Dr. Hernandez and she has been seeing him since about 2019 and they still do not know what is causing her symptoms.  Her daughter says that her last colonoscopy was maybe in 2019.      Brain MRI from 6/24/22 shows no acute intracranial findings and minimal chronic small vessel ischemic changes.  On 6/23/22, anemia was noted, but on 6/25/22, she had a significant decline in her H & H that required a blood transfusion.  Hemostais occurred as of yesterday to normal but the second CBC showed slight anemia.      Hospital Course: No notes on " file         Patient History               Medical as of 6/28/2022       Past Medical History       Diagnosis Date Comments Source    Coronary artery disease -- -- Provider    Hypertension -- -- Provider    Sarcoidosis, unspecified -- -- Provider    Sleep apnea, unspecified -- -- Provider    Thyroid disease -- -- Provider                          Surgical as of 6/28/2022       Past Surgical History       Procedure Laterality Date Comments Source    CORONARY ARTERY BYPASS GRAFT (CABG) -- -- -- Provider                          Family as of 6/28/2022       Problem Relation Name Age of Onset Comments Source    Heart disease Mother -- -- -- Provider    Dementia Mother -- -- -- Provider    Heart disease Father -- -- -- Provider    Mental illness Maternal Uncle -- -- -- Provider                  Tobacco Use as of 6/28/2022       Smoking Status Smoking Start Date Smoking Quit Date Packs/Day Years Used    Never Smoker -- -- -- --      Types Comments Smokeless Tobacco Status Smokeless Tobacco Quit Date Source    -- -- Never Used -- Provider                  Alcohol Use as of 6/28/2022       Alcohol Use Drinks/Week Alcohol/Week Comments Source    [facility]                  Drug Use as of 6/28/2022       Drug Use Types Frequency Comments Source    Never -- -- -- Provider                  Sexual Activity as of 6/28/2022    None               Activities of Daily Living as of 6/28/2022    None               Social Documentation as of 6/28/2022    None               Occupational as of 6/28/2022    None               Socioeconomic as of 6/28/2022       Marital Status Spouse Name Number of Children Years Education Education Level Preferred Language Ethnicity Race Source     -- 5 -- -- English Not  or /a Black or  Provider                  Pertinent History       Question Response Comments    Lives with family --    Place in Birth Order -- --    Lives in home --     Number of Siblings -- --    Raised by -- --    Legal Involvement none --    Childhood Trauma -- --    Criminal History of none --    Financial Status -- --    Highest Level of Education unfinished highschool --    Does patient have access to a firearm? -- --     Service -- --    Primary Leisure Activity time with family --    Spirituality -- --          Past Medical History:   Diagnosis Date    Coronary artery disease     Hypertension     Sarcoidosis, unspecified     Sleep apnea, unspecified     Thyroid disease      Past Surgical History:   Procedure Laterality Date    CORONARY ARTERY BYPASS GRAFT (CABG)       Family History       Problem Relation (Age of Onset)    Dementia Mother    Heart disease Mother, Father    Mental illness Maternal Uncle          Tobacco Use    Smoking status: Never Smoker    Smokeless tobacco: Never Used   Substance and Sexual Activity    Alcohol use: Not on file     Comment: occ. [facility]    Drug use: Never    Sexual activity: Not on file     Review of patient's allergies indicates:   Allergen Reactions    Adhesive tape-silicones     Latex Rash       No current facility-administered medications on file prior to encounter.     Current Outpatient Medications on File Prior to Encounter   Medication Sig    albuterol (PROVENTIL/VENTOLIN HFA) 90 mcg/actuation inhaler 1 puff as needed    aspirin 325 MG tablet 1 tablet    atorvastatin (LIPITOR) 20 MG tablet 1 tablet    bosentan (TRACLEER) 125 MG Tab 1 Tablet    cholestyramine, with sugar, 4 gram Powd 1 scoop    digoxin (LANOXIN) 250 mcg tablet 1 tablet    ferrous gluconate (FERGON) 324 MG tablet   TAKE 1 TABLET BY MOUTH EVERY DAY    fluticasone propionate (FLONASE) 50 mcg/actuation nasal spray by Nasal route.    hydroCHLOROthiazide (HYDRODIURIL) 25 MG tablet 1 tablet    isosorbide mononitrate (IMDUR) 30 MG 24 hr tablet 1/2 tablet    levothyroxine (SYNTHROID) 100 MCG tablet Take 100 mcg by mouth once daily.     "lisinopriL (PRINIVIL,ZESTRIL) 20 MG tablet 1 Tablet    metoprolol succinate (TOPROL-XL) 25 MG 24 hr tablet 1/2 tablet    pantoprazole (PROTONIX) 40 MG tablet 1 tablet    potassium chloride 10% (KAYCIEL) 20 mEq/15 mL oral solution   TAKE 15 ML BY MOUTH DAILY    predniSONE (DELTASONE) 20 MG tablet Take by mouth.    sulfamethoxazole-trimethoprim 800-160mg (BACTRIM DS) 800-160 mg Tab Take 1 tablet by mouth 2 (two) times daily. for 3 days    torsemide (DEMADEX) 20 MG Tab Take 20 mg by mouth once daily.     Psychotherapeutics (From admission, onward)                None          Review of Systems   Constitutional:  Positive for activity change.   HENT: Negative.     Eyes:  Positive for visual disturbance (has a hx of cataracts).   Respiratory: Negative.     Cardiovascular: Negative.    Gastrointestinal:  Positive for diarrhea.   Endocrine: Negative.    Genitourinary: Negative.    Musculoskeletal:  Positive for joint swelling (ankle/foot) and neck pain.   Skin: Negative.    Allergic/Immunologic: Negative.    Neurological:  Positive for weakness.   Hematological: Negative.    Psychiatric/Behavioral:  Positive for hallucinations. Negative for agitation, behavioral problems, dysphoric mood, sleep disturbance and suicidal ideas.    Strengths and Liabilities: Strength: Patient accepts guidance/feedback, Strength: Patient is expressive/articulate., Strength: Patient is motivated for change., Strength: Patient has positive support network.    Objective:     Vital Signs (Most Recent):  Temp: 98.4 °F (36.9 °C) (06/28/22 2010)  Pulse: 70 (06/28/22 2010)  Resp: 18 (06/28/22 2010)  BP: 133/76 (06/28/22 2010)  SpO2: 99 % (06/28/22 2010) Vital Signs (24h Range):  Temp:  [97.4 °F (36.3 °C)-99.9 °F (37.7 °C)] 98.4 °F (36.9 °C)  Pulse:  [60-99] 70  Resp:  [18] 18  SpO2:  [94 %-99 %] 99 %  BP: (102-149)/(61-76) 133/76     Height: 5' 4.02" (162.6 cm)  Weight: 63.5 kg (140 lb)  Body mass index is 24.02 kg/m².      Intake/Output Summary " (Last 24 hours) at 6/28/2022 2308  Last data filed at 6/28/2022 2200  Gross per 24 hour   Intake 800 ml   Output --   Net 800 ml       Physical Exam  Vitals and nursing note reviewed.   Constitutional:       Appearance: Normal appearance.   Neurological:      Mental Status: She is alert and oriented to person, place, and time.   Psychiatric:         Attention and Perception: Attention normal. She perceives visual hallucinations. She does not perceive auditory hallucinations.         Mood and Affect: Mood and affect normal.         Speech: Speech normal.         Behavior: Behavior normal. Behavior is cooperative.         Thought Content: Thought content normal. Thought content is not paranoid or delusional. Thought content does not include homicidal or suicidal ideation. Thought content does not include homicidal or suicidal plan.         Cognition and Memory: Cognition and memory normal.         Judgment: Judgment normal.      Comments: Very pleasant     NEUROLOGICAL EXAMINATION:     MENTAL STATUS   Oriented to person, place, and time.   Registration: recalls 3 of 3 objects. Recall at 5 minutes: recalls 2 of 3 objects.   Attention: normal. Concentration: normal.   Speech: speech is normal   Level of consciousness: alert  Knowledge: consistent with education.   Able to name object. Normal comprehension.   Significant Labs: Last 72 Hours:   Recent Lab Results  (Last 5 results in the past 72 hours)        06/28/22  1927   06/28/22  1828   06/28/22  1442   06/28/22  0447   06/27/22  0555        Phencyclidine Negative               Albumin/Globulin Ratio         0.8       Albumin         2.8       Alkaline Phosphatase         49       ALT         10       Ammonia       19.3         Amphetamine Screen, Ur Negative               Anion Gap       10.0         AST         19       Barbiturate Screen, Ur Negative               Baso #       0.04   0.05       Basophil %       0.4   0.6       Benzodiazepine Screen, Urine  Negative               BILIRUBIN TOTAL         0.4       BUN       8.3   9.9       BUN/CREAT RATIO       9         Calcium       9.1   9.8       Cannabinoids, Urine Negative               Chloride       106   104       CO2       26   31       Cocaine (Metab.) Negative               Creatinine       0.97   1.08       eGFR if        >60   >60       Eos #       0.08   0.12       Eosinophil %       0.7   1.4       Fentanyl, Urine Negative               Globulin, Total         3.6       Glucose       59   76       Hematocrit       33.8   33.8       Hemoglobin       11.0   11.3       Immature Grans (Abs)       0.07   0.06       Immature Granulocytes       0.6   0.7       Lymph #       0.87   1.03       LYMPH %       8.0   11.6       MCH       28.5   29.3       MCHC       32.5   33.4       MCV       87.6   87.6       MDMA, Urine Negative               Mono #       1.39   0.91       Mono %       12.7   10.2       MPV       11.9   11.4       Neut #       8.5   6.7       Neut %       77.6   75.5       nRBC       0.0   0.0       Opiate Scrn, Ur Negative               pH, Urine 5.0               Platelets       248   255       POCT Glucose     145           Potassium       3.8   3.5       PROTEIN TOTAL         6.4       RBC       3.86   3.86       RDW       15.9   15.8       Sodium       142   143       Specific Gravity, Urine Auto 1.009               Vit D, 25-Hydroxy   6.5             WBC       10.9   8.9                              Significant Imaging: I have reviewed all pertinent imaging results/findings within the past 24 hours.    Assessment/Plan:     * Hallucinations  Psychiatry Recommendations:  1.  No medication recommendations at this time.  2.  Rule out medical causes.  I have concerns about steroid use and rapid changes in hemodynamics.  3.  If able to medically wean her off prednisone at this time, I recommend doing so to assess if hallucinations will completely resolve.  She seems to have  experienced a decrease in hallucination episodes.  Blood count should continue to be monitored as I have concerns over rapid decrease in H & H.  4.  Re-consult psych if needed         Total Time:  80 minutes     BRE Melo-AUBREY   Psychiatry  [facility] - 5th Floor Med Surg

## 2022-06-29 NOTE — SUBJECTIVE & OBJECTIVE
Patient History               Medical as of 6/28/2022       Past Medical History       Diagnosis Date Comments Source    Coronary artery disease -- -- Provider    Hypertension -- -- Provider    Sarcoidosis, unspecified -- -- Provider    Sleep apnea, unspecified -- -- Provider    Thyroid disease -- -- Provider                          Surgical as of 6/28/2022       Past Surgical History       Procedure Laterality Date Comments Source    CORONARY ARTERY BYPASS GRAFT (CABG) -- -- -- Provider                          Family as of 6/28/2022       Problem Relation Name Age of Onset Comments Source    Heart disease Mother -- -- -- Provider    Dementia Mother -- -- -- Provider    Heart disease Father -- -- -- Provider    Mental illness Maternal Uncle -- -- -- Provider                  Tobacco Use as of 6/28/2022       Smoking Status Smoking Start Date Smoking Quit Date Packs/Day Years Used    Never Smoker -- -- -- --      Types Comments Smokeless Tobacco Status Smokeless Tobacco Quit Date Source    -- -- Never Used -- Provider                  Alcohol Use as of 6/28/2022       Alcohol Use Drinks/Week Alcohol/Week Comments Source    [facility]                  Drug Use as of 6/28/2022       Drug Use Types Frequency Comments Source    Never -- -- -- Provider                  Sexual Activity as of 6/28/2022    None               Activities of Daily Living as of 6/28/2022    None               Social Documentation as of 6/28/2022    None               Occupational as of 6/28/2022    None               Socioeconomic as of 6/28/2022       Marital Status Spouse Name Number of Children Years Education Education Level Preferred Language Ethnicity Race Source     -- 5 -- -- English Not  or /a Black or  Provider                  Pertinent History       Question Response Comments    Lives with family --    Place in Birth Order -- --    Lives in home --    Number of  Siblings -- --    Raised by -- --    Legal Involvement none --    Childhood Trauma -- --    Criminal History of none --    Financial Status -- --    Highest Level of Education unfinished highschool --    Does patient have access to a firearm? -- --     Service -- --    Primary Leisure Activity time with family --    Spirituality -- --          Past Medical History:   Diagnosis Date    Coronary artery disease     Hypertension     Sarcoidosis, unspecified     Sleep apnea, unspecified     Thyroid disease      Past Surgical History:   Procedure Laterality Date    CORONARY ARTERY BYPASS GRAFT (CABG)       Family History       Problem Relation (Age of Onset)    Dementia Mother    Heart disease Mother, Father    Mental illness Maternal Uncle          Tobacco Use    Smoking status: Never Smoker    Smokeless tobacco: Never Used   Substance and Sexual Activity    Alcohol use: Not on file     [facility]    Drug use: Never    Sexual activity: Not on file     Review of patient's allergies indicates:   Allergen Reactions    Adhesive tape-silicones     Latex Rash       No current facility-administered medications on file prior to encounter.     Current Outpatient Medications on File Prior to Encounter   Medication Sig    albuterol (PROVENTIL/VENTOLIN HFA) 90 mcg/actuation inhaler 1 puff as needed    aspirin 325 MG tablet 1 tablet    atorvastatin (LIPITOR) 20 MG tablet 1 tablet    bosentan (TRACLEER) 125 MG Tab 1 Tablet    cholestyramine, with sugar, 4 gram Powd 1 scoop    digoxin (LANOXIN) 250 mcg tablet 1 tablet    ferrous gluconate (FERGON) 324 MG tablet   TAKE 1 TABLET BY MOUTH EVERY DAY    fluticasone propionate (FLONASE) 50 mcg/actuation nasal spray by Nasal route.    hydroCHLOROthiazide (HYDRODIURIL) 25 MG tablet 1 tablet    isosorbide mononitrate (IMDUR) 30 MG 24 hr tablet 1/2 tablet    levothyroxine (SYNTHROID) 100 MCG tablet Take 100 mcg by mouth once daily.    lisinopriL (PRINIVIL,ZESTRIL) 20 MG  "tablet 1 Tablet    metoprolol succinate (TOPROL-XL) 25 MG 24 hr tablet 1/2 tablet    pantoprazole (PROTONIX) 40 MG tablet 1 tablet    potassium chloride 10% (KAYCIEL) 20 mEq/15 mL oral solution   TAKE 15 ML BY MOUTH DAILY    predniSONE (DELTASONE) 20 MG tablet Take by mouth.    sulfamethoxazole-trimethoprim 800-160mg (BACTRIM DS) 800-160 mg Tab Take 1 tablet by mouth 2 (two) times daily. for 3 days    torsemide (DEMADEX) 20 MG Tab Take 20 mg by mouth once daily.     Psychotherapeutics (From admission, onward)                None          Review of Systems   Constitutional:  Positive for activity change.   HENT: Negative.     Eyes:  Positive for visual disturbance (has a hx of cataracts).   Respiratory: Negative.     Cardiovascular: Negative.    Gastrointestinal:  Positive for diarrhea.   Endocrine: Negative.    Genitourinary: Negative.    Musculoskeletal:  Positive for joint swelling (ankle/foot) and neck pain.   Skin: Negative.    Allergic/Immunologic: Negative.    Neurological:  Positive for weakness.   Hematological: Negative.    Psychiatric/Behavioral:  Positive for hallucinations. Negative for agitation, behavioral problems, dysphoric mood, sleep disturbance and suicidal ideas.    Strengths and Liabilities: Strength: Patient accepts guidance/feedback, Strength: Patient is expressive/articulate., Strength: Patient is motivated for change., Strength: Patient has positive support network.    Objective:     Vital Signs (Most Recent):  Temp: 98.4 °F (36.9 °C) (06/28/22 2010)  Pulse: 70 (06/28/22 2010)  Resp: 18 (06/28/22 2010)  BP: 133/76 (06/28/22 2010)  SpO2: 99 % (06/28/22 2010) Vital Signs (24h Range):  Temp:  [97.4 °F (36.3 °C)-99.9 °F (37.7 °C)] 98.4 °F (36.9 °C)  Pulse:  [60-99] 70  Resp:  [18] 18  SpO2:  [94 %-99 %] 99 %  BP: (102-149)/(61-76) 133/76     Height: 5' 4.02" (162.6 cm)  Weight: 63.5 kg (140 lb)  Body mass index is 24.02 kg/m².      Intake/Output Summary (Last 24 hours) at 6/28/2022 0337  Last " data filed at 6/28/2022 2200  Gross per 24 hour   Intake 800 ml   Output --   Net 800 ml       Physical Exam  Vitals and nursing note reviewed.   Constitutional:       Appearance: Normal appearance.   Neurological:      Mental Status: She is alert and oriented to person, place, and time.   Psychiatric:         Attention and Perception: Attention normal. She perceives visual hallucinations. She does not perceive auditory hallucinations.         Mood and Affect: Mood and affect normal.         Speech: Speech normal.         Behavior: Behavior normal. Behavior is cooperative.         Thought Content: Thought content normal. Thought content is not paranoid or delusional. Thought content does not include homicidal or suicidal ideation. Thought content does not include homicidal or suicidal plan.         Cognition and Memory: Cognition and memory normal.         Judgment: Judgment normal.      Comments: Very pleasant     NEUROLOGICAL EXAMINATION:     MENTAL STATUS   Oriented to person, place, and time.   Registration: recalls 3 of 3 objects. Recall at 5 minutes: recalls 2 of 3 objects.   Attention: normal. Concentration: normal.   Speech: speech is normal   Level of consciousness: alert  Knowledge: consistent with education.   Able to name object. Normal comprehension.   Significant Labs: Last 72 Hours:   Recent Lab Results  (Last 5 results in the past 72 hours)        06/28/22  1927   06/28/22  1828   06/28/22  1442   06/28/22  0447   06/27/22  0555        Phencyclidine Negative               Albumin/Globulin Ratio         0.8       Albumin         2.8       Alkaline Phosphatase         49       ALT         10       Ammonia       19.3         Amphetamine Screen, Ur Negative               Anion Gap       10.0         AST         19       Barbiturate Screen, Ur Negative               Baso #       0.04   0.05       Basophil %       0.4   0.6       Benzodiazepine Screen, Urine Negative               BILIRUBIN TOTAL          0.4       BUN       8.3   9.9       BUN/CREAT RATIO       9         Calcium       9.1   9.8       Cannabinoids, Urine Negative               Chloride       106   104       CO2       26   31       Cocaine (Metab.) Negative               Creatinine       0.97   1.08       eGFR if        >60   >60       Eos #       0.08   0.12       Eosinophil %       0.7   1.4       Fentanyl, Urine Negative               Globulin, Total         3.6       Glucose       59   76       Hematocrit       33.8   33.8       Hemoglobin       11.0   11.3       Immature Grans (Abs)       0.07   0.06       Immature Granulocytes       0.6   0.7       Lymph #       0.87   1.03       LYMPH %       8.0   11.6       MCH       28.5   29.3       MCHC       32.5   33.4       MCV       87.6   87.6       MDMA, Urine Negative               Mono #       1.39   0.91       Mono %       12.7   10.2       MPV       11.9   11.4       Neut #       8.5   6.7       Neut %       77.6   75.5       nRBC       0.0   0.0       Opiate Scrn, Ur Negative               pH, Urine 5.0               Platelets       248   255       POCT Glucose     145           Potassium       3.8   3.5       PROTEIN TOTAL         6.4       RBC       3.86   3.86       RDW       15.9   15.8       Sodium       142   143       Specific Gravity, Urine Auto 1.009               Vit D, 25-Hydroxy   6.5             WBC       10.9   8.9                              Significant Imaging: I have reviewed all pertinent imaging results/findings within the past 24 hours.

## 2022-06-29 NOTE — PROGRESS NOTES
[facility]  Hospital Medicine Progress Note        Chief Complaint: halluicination.     HPI:   67 y.o. female who  has a past medical history of Coronary artery disease, Hypertension, and Thyroid disease.. The patient presented to [facility] on 6/27/2022 with a primary complaint of hallucinations.     She was just discharged yesterday from the hospital after admission for UTI and hallucinations. She says the visual hallucinations worsened after she went home. She sees people or animals. She also had right lower quadrant/groin and right lower flank pain. She denies having hallucinations prior to a week ago. Denies hx of depression, anxiety or other psychiatric diagnoses. No family is at bedside for additional history. She reports having mild forgetfulness at times. She denies being officially diagnosed with dementia.    Interval Hx:   A daughter is present at bedside (lives in [place]) another daughter via phone and son in law present at bedside providing some information.  On yesterday during initial conversation with family, I expressed my concern that her steroid therapy was likely lcontributing to patient's hallucinations; the family was unsure of how long she has been on steroid therapy but states that when she was discharged from this facility she was continued on medication.  It seems that patient has been on medication for several weeks, with max the patient herself she tells me she did not realize she was on steroids she thought it was long time for gout; I educated the patient on gout treatment, steriod therapy including complications of long term use.   In so mentioning, patient is currently with gout flare that caused decreased mobility however after receiving indomethacin on yesterday, patient is now able to ambulate without difficulty.  Patient is to continue indomethacin for a 3 day course, side effects have been discussed with patient.  This is not intended for long-term use.     She has not had hallucinations since admission.  Family is significantly concerned about patient's hemoglobin however it is stable.  Daughter is concerned because this is ongoing problem however patient does have a GI physician, she has not had PillCam but that is the next step in plan. There is no indication at this time to consult GI physician and patient is advised to followup with her PCP outpatient.     Objective/physical exam:  General: Appears comfortable, no acute distress.  Integumentary: Warm, dry, intact.  Musculoskeletal: Purposeful movement noted.     Respiratory: No accessory muscle use. Breath sounds are equal.  Cardiovascular: Regular rate. No peripheral edema.    VITAL SIGNS: 24 HRS MIN & MAX LAST   Temp  Min: 97.4 °F (36.3 °C)  Max: 98.4 °F (36.9 °C) 97.9 °F (36.6 °C)   BP  Min: 108/54  Max: 133/76 (!) 116/57   Pulse  Min: 53  Max: 75  (!) 53   Resp  Min: 18  Max: 18 18   SpO2  Min: 96 %  Max: 99 % 99 %     CT Head Without Contrast  Narrative: EXAMINATION:  CT HEAD WITHOUT CONTRAST    CLINICAL HISTORY:  Mental status change, unknown cause;    TECHNIQUE:  Low dose axial CT images obtained throughout the head without intravenous contrast.  Axial, sagittal and coronal reconstructions were performed and interpreted.    DLP: 1051 mGycm    All CT scans at this location are performed using dose optimization techniques as appropriate to a performed exam including the following automated exposure control, adjustment of the mA and/or kV according to patient size and/or use of iterative reconstruction technique    COMPARISON:  MRI brain 06/24/2022    FINDINGS:  BRAIN: Gray white differentiation is maintained. Mild cerebral atrophy.  No hemorrhage. No edema. No mass effect or midline shift.  The posterior fossa and midline structures are unremarkable.  Atherosclerotic calcifications at the cavernous carotid arteries.    VENTRICLES: Ex vacuo dilatation of the ventricles.    EXTRA-AXIAL: No abnormal  extra-axial collections.    BONES: Calvarium is intact.    SINUSES: Visualized paranasal sinuses and mastoid air cells are clear.  Impression: No appreciable acute intracranial abnormality.    Electronically signed by: Betsey Cross  Date:    06/28/2022  Time:    18:24    Recent Labs   Lab 06/27/22  0555 06/28/22  0447 06/29/22  0608   WBC 8.9 10.9 12.6*   RBC 3.86* 3.86* 3.65*   HGB 11.3* 11.0* 10.5*   HCT 33.8* 33.8* 33.1*   MCV 87.6 87.6 90.7   MCH 29.3 28.5 28.8   MCHC 33.4 32.5* 31.7*   RDW 15.8 15.9 16.3    248 222   MPV 11.4 11.9 11.9       Recent Labs   Lab 06/26/22  0457 06/27/22  0150 06/27/22  0555 06/28/22  0447 06/29/22  0442    141 143 142 139   K 3.7 3.7 3.5 3.8 3.8   CO2 25 26 31 26 26   BUN 12.4 9.8 9.9 8.3* 17.7   CREATININE 1.08* 1.28* 1.08* 0.97 1.82*   CALCIUM 8.5 10.1 9.8 9.1 9.3   MG  --   --   --   --  1.50*   ALBUMIN 2.3* 3.3* 2.8*  --   --    ALKPHOS 44 58 49  --   --    ALT 7 11 10  --   --    AST 16 23 19  --   --    BILITOT 0.5 0.5 0.4  --   --           Microbiology Results (last 7 days)     Procedure Component Value Units Date/Time    Stool Culture [[id number]]     Order Status: Sent Specimen: Stool            See below for Radiology    Scheduled Med:   aspirin  325 mg Oral Daily    atorvastatin  20 mg Oral QHS    cholestyramine  1 packet Oral Daily    digoxin  0.25 mg Oral Daily    enoxaparin  40 mg Subcutaneous Daily    ferrous sulfate  1 tablet Oral Daily    fluticasone propionate  1 spray Each Nostril Daily    hydroCHLOROthiazide  25 mg Oral Daily    indomethacin  75 mg Oral BID WM    isosorbide mononitrate  30 mg Oral Daily    levothyroxine  100 mcg Oral Before breakfast    lisinopriL  20 mg Oral Daily    magnesium sulfate IVPB  2 g Intravenous Once    metoprolol succinate  12.5 mg Oral Daily    pantoprazole  40 mg Oral Daily    potassium bicarbonate  25 mEq Oral Daily    predniSONE  20 mg Oral Daily    torsemide  20 mg Oral Daily    vitamin D   1,000 Units Oral Daily      PRN Meds:  acetaminophen, albuterol, sodium chloride 0.9%, sodium chloride 0.9%     Nutrition Status:  Regular diet as tolerated.     Assessment/Plan:  Visual Hallucinations  Recently coli UTI status post treatment  Hypertension-controlled    Plan  Visual hallucinations w/o neurological deficits without history of underlying psychiatric illness, psych seen no further evaluation/treatment is needed at this time.   S/p gabapentin, medications reviewed initiated taper for steroid therapy is likely the contributor.   UDS negative.  TSH within normal limits.  Vitamin D deficiency-start supplementation     Chronic anemia-stable. followup on stool studies; patient to followup with GI physician outpatient for pillcam as previously planned.      LUDMILA-will order urine studies to further evaluate, consider IV fluid hydration. Repeat blood chemistry in the a.m. if stable can discharge in the a.m.    Left ankle pain--much improved--x-ray without acute fracture., continue supportive care, weightbear as tolerated.history of gout, likely gout, started indomethacin.  Gout-continue home medications, started indomethacin day2. steriod therapy should not be used longterm for such.     Anticipated discharge and Disposition:  Pending.     All diagnosis and differential diagnosis have been reviewed; assessment and plan has been documented; I have personally reviewed the labs and test results that are presently available; I have reviewed the patients medication list; I have reviewed the consulting providers response and recommendations. I have reviewed or attempted to review medical records based upon their availability    All of the patient's questions have been  addressed and answered. Patient's is agreeable to the above stated plan.   I will continue to monitor closely and make adjustments to medical management as needed.      Zandra Ureña,    06/29/2022      This note was created with the assistance of  [employer] voice recognition software. There may be transcription errors as a result of using this technology however minimal. Effort has been made to assure accuracy of transcription but any obvious errors or omissions should be clarified with the author of the document.

## 2022-06-30 LAB
ANION GAP SERPL CALC-SCNC: 7 MEQ/L
BASOPHILS # BLD AUTO: 0.02 X10(3)/MCL (ref 0–0.2)
BASOPHILS NFR BLD AUTO: 0.2 %
BUN SERPL-MCNC: 28 MG/DL (ref 9.8–20.1)
CALCIUM SERPL-MCNC: 8.8 MG/DL (ref 8.4–10.2)
CHLORIDE SERPL-SCNC: 104 MMOL/L (ref 98–107)
CO2 SERPL-SCNC: 27 MMOL/L (ref 23–31)
CREAT SERPL-MCNC: 2.85 MG/DL (ref 0.55–1.02)
CREAT/UREA NIT SERPL: 10
EOSINOPHIL # BLD AUTO: 0.02 X10(3)/MCL (ref 0–0.9)
EOSINOPHIL NFR BLD AUTO: 0.2 %
ERYTHROCYTE [DISTWIDTH] IN BLOOD BY AUTOMATED COUNT: 16.2 % (ref 11.5–17)
GLUCOSE SERPL-MCNC: 92 MG/DL (ref 82–115)
HCT VFR BLD AUTO: 31.6 % (ref 37–47)
HGB BLD-MCNC: 9.8 GM/DL (ref 12–16)
IMM GRANULOCYTES # BLD AUTO: 0.06 X10(3)/MCL (ref 0–0.02)
IMM GRANULOCYTES NFR BLD AUTO: 0.6 % (ref 0–0.43)
LYMPHOCYTES # BLD AUTO: 0.83 X10(3)/MCL (ref 0.6–4.6)
LYMPHOCYTES NFR BLD AUTO: 8.1 %
MAGNESIUM SERPL-MCNC: 2.1 MG/DL (ref 1.6–2.6)
MCH RBC QN AUTO: 28.2 PG (ref 27–31)
MCHC RBC AUTO-ENTMCNC: 31 MG/DL (ref 33–36)
MCV RBC AUTO: 91.1 FL (ref 80–94)
MONOCYTES # BLD AUTO: 0.82 X10(3)/MCL (ref 0.1–1.3)
MONOCYTES NFR BLD AUTO: 8 %
NEUTROPHILS # BLD AUTO: 8.5 X10(3)/MCL (ref 2.1–9.2)
NEUTROPHILS NFR BLD AUTO: 82.9 %
NRBC BLD AUTO-RTO: 0 %
PHOSPHATE SERPL-MCNC: 3.2 MG/DL (ref 2.3–4.7)
PLATELET # BLD AUTO: 237 X10(3)/MCL (ref 130–400)
PMV BLD AUTO: 12.6 FL (ref 7.4–10.4)
POCT GLUCOSE: 102 MG/DL (ref 70–110)
POCT GLUCOSE: 102 MG/DL (ref 70–110)
POCT GLUCOSE: 104 MG/DL (ref 70–110)
POCT GLUCOSE: 89 MG/DL (ref 70–110)
POCT GLUCOSE: 91 MG/DL (ref 70–110)
POTASSIUM SERPL-SCNC: 4.5 MMOL/L (ref 3.5–5.1)
RBC # BLD AUTO: 3.47 X10(6)/MCL (ref 4.2–5.4)
SARS-COV-2 RDRP RESP QL NAA+PROBE: NEGATIVE
SODIUM SERPL-SCNC: 138 MMOL/L (ref 136–145)
VIT B12 SERPL-MCNC: 216 NG/L (ref 180–914)
WBC # SPEC AUTO: 10.3 X10(3)/MCL (ref 4.5–11.5)

## 2022-06-30 PROCEDURE — 11000001 HC ACUTE MED/SURG PRIVATE ROOM

## 2022-06-30 PROCEDURE — 25000003 PHARM REV CODE 250: Performed by: STUDENT IN AN ORGANIZED HEALTH CARE EDUCATION/TRAINING PROGRAM

## 2022-06-30 PROCEDURE — 25000003 PHARM REV CODE 250: Performed by: INTERNAL MEDICINE

## 2022-06-30 PROCEDURE — 83735 ASSAY OF MAGNESIUM: CPT | Performed by: STUDENT IN AN ORGANIZED HEALTH CARE EDUCATION/TRAINING PROGRAM

## 2022-06-30 PROCEDURE — 80048 BASIC METABOLIC PNL TOTAL CA: CPT | Performed by: INTERNAL MEDICINE

## 2022-06-30 PROCEDURE — 36415 COLL VENOUS BLD VENIPUNCTURE: CPT | Performed by: INTERNAL MEDICINE

## 2022-06-30 PROCEDURE — 97110 THERAPEUTIC EXERCISES: CPT | Mod: CQ

## 2022-06-30 PROCEDURE — 87635 SARS-COV-2 COVID-19 AMP PRB: CPT | Performed by: INTERNAL MEDICINE

## 2022-06-30 PROCEDURE — 63600175 PHARM REV CODE 636 W HCPCS: Performed by: INTERNAL MEDICINE

## 2022-06-30 PROCEDURE — 97116 GAIT TRAINING THERAPY: CPT | Mod: CQ

## 2022-06-30 PROCEDURE — 84100 ASSAY OF PHOSPHORUS: CPT | Performed by: STUDENT IN AN ORGANIZED HEALTH CARE EDUCATION/TRAINING PROGRAM

## 2022-06-30 PROCEDURE — 85025 COMPLETE CBC W/AUTO DIFF WBC: CPT | Performed by: INTERNAL MEDICINE

## 2022-06-30 RX ORDER — PREDNISONE 5 MG/1
5 TABLET ORAL DAILY
Status: DISCONTINUED | OUTPATIENT
Start: 2022-06-30 | End: 2022-07-03 | Stop reason: HOSPADM

## 2022-06-30 RX ORDER — SODIUM CHLORIDE 9 MG/ML
INJECTION, SOLUTION INTRAVENOUS CONTINUOUS
Status: DISCONTINUED | OUTPATIENT
Start: 2022-06-30 | End: 2022-07-03

## 2022-06-30 RX ADMIN — ENOXAPARIN SODIUM 40 MG: 40 INJECTION SUBCUTANEOUS at 05:06

## 2022-06-30 RX ADMIN — LEVOTHYROXINE SODIUM 100 MCG: 100 TABLET ORAL at 05:06

## 2022-06-30 RX ADMIN — ASPIRIN 325 MG ORAL TABLET 325 MG: 325 PILL ORAL at 09:06

## 2022-06-30 RX ADMIN — ISOSORBIDE MONONITRATE 30 MG: 30 TABLET, EXTENDED RELEASE ORAL at 09:06

## 2022-06-30 RX ADMIN — POTASSIUM BICARBONATE 25 MEQ: 977.5 TABLET, EFFERVESCENT ORAL at 09:06

## 2022-06-30 RX ADMIN — METOPROLOL SUCCINATE 12.5 MG: 25 TABLET, EXTENDED RELEASE ORAL at 09:06

## 2022-06-30 RX ADMIN — CHOLECALCIFEROL TAB 25 MCG (1000 UNIT) 1000 UNITS: 25 TAB at 09:06

## 2022-06-30 RX ADMIN — Medication 1 EACH: at 09:06

## 2022-06-30 RX ADMIN — SODIUM CHLORIDE: 9 INJECTION, SOLUTION INTRAVENOUS at 10:06

## 2022-06-30 RX ADMIN — DIGOXIN 0.25 MG: 125 TABLET ORAL at 09:06

## 2022-06-30 RX ADMIN — CHOLESTYRAMINE 4 G: 4 POWDER, FOR SUSPENSION ORAL at 09:06

## 2022-06-30 RX ADMIN — ATORVASTATIN CALCIUM 20 MG: 10 TABLET, FILM COATED ORAL at 08:06

## 2022-06-30 RX ADMIN — PREDNISONE 5 MG: 5 TABLET ORAL at 09:06

## 2022-06-30 RX ADMIN — PANTOPRAZOLE SODIUM 40 MG: 40 TABLET, DELAYED RELEASE ORAL at 09:06

## 2022-06-30 RX ADMIN — FLUTICASONE PROPIONATE 50 MCG: 50 SPRAY, METERED NASAL at 09:06

## 2022-06-30 NOTE — PT/OT/SLP PROGRESS
Physical Therapy  Treatment    Lila Carmona   MRN: [id number]   Admitting Diagnosis: Hallucinations       PT Start Time: 1130     PT Stop Time: 1155    PT Total Time (min): 25 min       Billable Minutes:  Gait Training 13 and Therapeutic Exercise 12    Treatment Type: Treatment  PT/PTA: PTA     PTA Visit Number: 2       General Precautions: Standard, fall  Orthopedic Precautions: N/A   Braces:    Respiratory Status: Room air    Spiritual, Cultural Beliefs, Lutheran Practices, Values that Affect Care: no    Subjective:  Communicated with NSG prior to session.           Objective:        Functional Mobility:  Bed Mobility:    Supine to sit. Mod I    Transfers:Sit to/from stand Mod I       Gait: Pt ambulated ~450ft with straight cane. SBA. Some lateral swaying noted at times but no overt LOB. Slow but steady step through gait pattern. Decreased sep length and hellen.           Therapeutic Activities and Exercises:  ESAN LE therex x 15 reps    AM-PAC 6 CLICK MOBILITY  How much help from another person does this patient currently need?   1 = Unable, Total/Dependent Assistance  2 = A lot, Maximum/Moderate Assistance  3 = A little, Minimum/Contact Guard/Supervision  4 = None, Modified [place]/Independent         AM-PAC Raw Score CMS G-Code Modifier Level of Impairment Assistance   6 % Total / Unable   7 - 9 CM 80 - 100% Maximal Assist   10 - 14 CL 60 - 80% Moderate Assist   15 - 19 CK 40 - 60% Moderate Assist   20 - 22 CJ 20 - 40% Minimal Assist   23 CI 1-20% SBA / CGA   24 CH 0% Independent/ Mod I     Patient left up in chair with all lines intact and call button in reach.    Assessment:  Lila Carmona is a 67 y.o. female with a medical diagnosis of Hallucinations.    Rehab identified problem list/impairments: Rehab identified problem list/impairments: weakness, impaired endurance, gait instability    Rehab potential is excellent.    Activity tolerance: Excellent    Discharge recommendations:       Barriers  to discharge:      Equipment recommendations:       GOALS:   Multidisciplinary Problems     Physical Therapy Goals        Problem: Physical Therapy    Goal Priority Disciplines Outcome Goal Variances Interventions   Physical Therapy Goal     PT, PT/OT Ongoing, Progressing     Description: Goals to be met by: 22     Patient will increase functional independence with mobility by performin. Sit to stand transfer with Modified [place]  2. Gait  x 200 feet with Modified [place] using Rolling Walker.                      PLAN:    Patient to be seen 5 x/week  to address the above listed problems via gait training, therapeutic activities, therapeutic exercises  Plan of Care expires: 22  Plan of Care reviewed with: patient         2022

## 2022-06-30 NOTE — PROGRESS NOTES
[facility] Medicine Progress Note        Chief Complaint: Inpatient Follow-up for ARF    HPI:   67 y.o. female who  has a past medical history of Coronary artery disease, Hypertension, and Thyroid disease.. The patient presented to [facility] on 6/27/2022 with a primary complaint of hallucinations.     She was just discharged yesterday from the hospital after admission for UTI and hallucinations. She says the visual hallucinations worsened after she went home. She sees people or animals. She also had right lower quadrant/groin and right lower flank pain. She denies having hallucinations prior to a week ago. Denies hx of depression, anxiety or other psychiatric diagnoses. No family is at bedside for additional history. She reports having mild forgetfulness at times. She denies being officially diagnosed with dementia.    Interval Hx:   Patient today awake and comfortable. Oriented x 3 and following all verbal commands. Sitting in the chair. Ambulating well. Denies any fever, chills or SOB. No foot or ankle pain. Daughter at bedside. Explained in detail about todays labs and tx plan.     Objective/physical exam:  General: In no acute distress, afebrile  Chest: Clear to auscultation bilaterally  Heart: RRR, +S1, S2, no appreciable murmur  Abdomen: Soft, nontender, BS +  MSK: Warm, no lower extremity edema, no clubbing or cyanosis  Neurologic: Alert and oriented x4, Cranial nerve II-XII intact, Strength 5/5 in all 4 extremities    VITAL SIGNS: 24 HRS MIN & MAX LAST   Temp  Min: 97.5 °F (36.4 °C)  Max: 98.4 °F (36.9 °C) 97.7 °F (36.5 °C)   BP  Min: 103/55  Max: 126/75 (!) 103/55   Pulse  Min: 52  Max: 82  82   Resp  Min: 18  Max: 18 18   SpO2  Min: 98 %  Max: 100 % 98 %       Recent Labs   Lab 06/28/22  0447 06/29/22  0608 06/30/22  0357   WBC 10.9 12.6* 10.3   RBC 3.86* 3.65* 3.47*   HGB 11.0* 10.5* 9.8*   HCT 33.8* 33.1* 31.6*   MCV 87.6 90.7 91.1   MCH 28.5 28.8 28.2   MCHC 32.5*  31.7* 31.0*   RDW 15.9 16.3 16.2    222 237   MPV 11.9 11.9 12.6*       Recent Labs   Lab 06/26/22  0457 06/27/22  0150 06/27/22  0555 06/28/22  0447 06/29/22  0442 06/30/22  0357    141 143 142 139 138   K 3.7 3.7 3.5 3.8 3.8 4.5   CO2 25 26 31 26 26 27   BUN 12.4 9.8 9.9 8.3* 17.7 28.0*   CREATININE 1.08* 1.28* 1.08* 0.97 1.82* 2.85*   CALCIUM 8.5 10.1 9.8 9.1 9.3 8.8   MG  --   --   --   --  1.50* 2.10   ALBUMIN 2.3* 3.3* 2.8*  --   --   --    ALKPHOS 44 58 49  --   --   --    ALT 7 11 10  --   --   --    AST 16 23 19  --   --   --    BILITOT 0.5 0.5 0.4  --   --   --           Microbiology Results (last 7 days)     Procedure Component Value Units Date/Time    Stool Culture [[id number]]     Order Status: Sent Specimen: Stool            See below for Radiology    Scheduled Med:   aspirin  325 mg Oral Daily    atorvastatin  20 mg Oral QHS    cholestyramine  1 packet Oral Daily    digoxin  0.25 mg Oral Daily    enoxaparin  40 mg Subcutaneous Daily    ferrous sulfate  1 tablet Oral Daily    fluticasone propionate  1 spray Each Nostril Daily    isosorbide mononitrate  30 mg Oral Daily    levothyroxine  100 mcg Oral Before breakfast    metoprolol succinate  12.5 mg Oral Daily    pantoprazole  40 mg Oral Daily    potassium bicarbonate  25 mEq Oral Daily    predniSONE  15 mg Oral Daily    vitamin D  1,000 Units Oral Daily        Continuous Infusions:   sodium chloride 0.9%          PRN Meds:  acetaminophen, albuterol, sodium chloride 0.9%, sodium chloride 0.9%       Assessment/Plan:  Visual Hallucinations, prob from steroid use. Resolved   ARF  Recently coli UTI status post treatment  Hypertension-controlled  H/O Gout     Plan:  Patient mental status has improved  Her hallucinations could be from steroid use, will gradually taper and stop  Renal functions are elevated. Reviewed home meds. Stopped Diuretics and ACEI for now   Started on iv hydration x 1 liter   Avoid NSAIDs and other nephrotoxic  agents   Cont supportive care     BMP in am         VTE prophylaxis: Lovenox     Patient condition:  Fair    Anticipated discharge and Disposition:   Home, will need HH. Dc when renal functions improve.       All diagnosis and differential diagnosis have been reviewed; assessment and plan has been documented; I have personally reviewed the labs and test results that are presently available; I have reviewed the patients medication list; I have reviewed the consulting providers response and recommendations. I have reviewed or attempted to review medical records based upon their availability    All of the patient's questions have been  addressed and answered. Patient's is agreeable to the above stated plan. I will continue to monitor closely and make adjustments to medical management as needed.  _____________________________________________________________________    Nutrition Status:    Radiology:  CT Head Without Contrast  Narrative: EXAMINATION:  CT HEAD WITHOUT CONTRAST    CLINICAL HISTORY:  Mental status change, unknown cause;    TECHNIQUE:  Low dose axial CT images obtained throughout the head without intravenous contrast.  Axial, sagittal and coronal reconstructions were performed and interpreted.    DLP: 1051 mGycm    All CT scans at this location are performed using dose optimization techniques as appropriate to a performed exam including the following automated exposure control, adjustment of the mA and/or kV according to patient size and/or use of iterative reconstruction technique    COMPARISON:  MRI brain 06/24/2022    FINDINGS:  BRAIN: Gray white differentiation is maintained. Mild cerebral atrophy.  No hemorrhage. No edema. No mass effect or midline shift.  The posterior fossa and midline structures are unremarkable.  Atherosclerotic calcifications at the cavernous carotid arteries.    VENTRICLES: Ex vacuo dilatation of the ventricles.    EXTRA-AXIAL: No abnormal extra-axial collections.    BONES:  Calvarium is intact.    SINUSES: Visualized paranasal sinuses and mastoid air cells are clear.  Impression: No appreciable acute intracranial abnormality.    Electronically signed by: Betsey Cross  Date:    06/28/2022  Time:    18:24      Vinayak Borrego MD   06/30/2022

## 2022-07-01 LAB
ANION GAP SERPL CALC-SCNC: 9 MEQ/L
BASOPHILS # BLD AUTO: 0.03 X10(3)/MCL (ref 0–0.2)
BASOPHILS NFR BLD AUTO: 0.4 %
BUN SERPL-MCNC: 34.2 MG/DL (ref 9.8–20.1)
CALCIUM SERPL-MCNC: 8.6 MG/DL (ref 8.4–10.2)
CHLORIDE SERPL-SCNC: 102 MMOL/L (ref 98–107)
CO2 SERPL-SCNC: 27 MMOL/L (ref 23–31)
CREAT SERPL-MCNC: 2.82 MG/DL (ref 0.55–1.02)
CREAT/UREA NIT SERPL: 12
ELASTASE PANC STL-MCNT: 209 MCG/G
EOSINOPHIL # BLD AUTO: 0.07 X10(3)/MCL (ref 0–0.9)
EOSINOPHIL NFR BLD AUTO: 0.9 %
ERYTHROCYTE [DISTWIDTH] IN BLOOD BY AUTOMATED COUNT: 16.2 % (ref 11.5–17)
FECAL LEUKOCYTE (OHS): POSITIVE
GLUCOSE SERPL-MCNC: 78 MG/DL (ref 82–115)
HCT VFR BLD AUTO: 30 % (ref 37–47)
HGB BLD-MCNC: 9.7 GM/DL (ref 12–16)
IMM GRANULOCYTES # BLD AUTO: 0.05 X10(3)/MCL (ref 0–0.04)
IMM GRANULOCYTES NFR BLD AUTO: 0.6 %
LEUKO NEG CONT (OHS): NEGATIVE
LEUKO POS CONT (OHS): POSITIVE
LYMPHOCYTES # BLD AUTO: 1.34 X10(3)/MCL (ref 0.6–4.6)
LYMPHOCYTES NFR BLD AUTO: 17.4 %
MCH RBC QN AUTO: 28 PG (ref 27–31)
MCHC RBC AUTO-ENTMCNC: 32.3 MG/DL (ref 33–36)
MCV RBC AUTO: 86.7 FL (ref 80–94)
MONOCYTES # BLD AUTO: 0.59 X10(3)/MCL (ref 0.1–1.3)
MONOCYTES NFR BLD AUTO: 7.6 %
NEUTROPHILS # BLD AUTO: 5.6 X10(3)/MCL (ref 2.1–9.2)
NEUTROPHILS NFR BLD AUTO: 73.1 %
NRBC BLD AUTO-RTO: 0 %
PLATELET # BLD AUTO: 237 X10(3)/MCL (ref 130–400)
PMV BLD AUTO: 12.8 FL (ref 7.4–10.4)
POCT GLUCOSE: 107 MG/DL (ref 70–110)
POCT GLUCOSE: 83 MG/DL (ref 70–110)
POCT GLUCOSE: 95 MG/DL (ref 70–110)
POTASSIUM SERPL-SCNC: 4.2 MMOL/L (ref 3.5–5.1)
RBC # BLD AUTO: 3.46 X10(6)/MCL (ref 4.2–5.4)
SODIUM SERPL-SCNC: 138 MMOL/L (ref 136–145)
WBC # SPEC AUTO: 7.7 X10(3)/MCL (ref 4.5–11.5)

## 2022-07-01 PROCEDURE — 97530 THERAPEUTIC ACTIVITIES: CPT | Mod: CQ

## 2022-07-01 PROCEDURE — 36415 COLL VENOUS BLD VENIPUNCTURE: CPT | Performed by: INTERNAL MEDICINE

## 2022-07-01 PROCEDURE — 25000003 PHARM REV CODE 250: Performed by: INTERNAL MEDICINE

## 2022-07-01 PROCEDURE — 85025 COMPLETE CBC W/AUTO DIFF WBC: CPT | Performed by: INTERNAL MEDICINE

## 2022-07-01 PROCEDURE — 82270 OCCULT BLOOD FECES: CPT | Performed by: INTERNAL MEDICINE

## 2022-07-01 PROCEDURE — 80048 BASIC METABOLIC PNL TOTAL CA: CPT | Performed by: INTERNAL MEDICINE

## 2022-07-01 PROCEDURE — 25000003 PHARM REV CODE 250: Performed by: STUDENT IN AN ORGANIZED HEALTH CARE EDUCATION/TRAINING PROGRAM

## 2022-07-01 PROCEDURE — 63600175 PHARM REV CODE 636 W HCPCS: Performed by: INTERNAL MEDICINE

## 2022-07-01 PROCEDURE — 86403 PARTICLE AGGLUT ANTBDY SCRN: CPT | Performed by: STUDENT IN AN ORGANIZED HEALTH CARE EDUCATION/TRAINING PROGRAM

## 2022-07-01 PROCEDURE — 97116 GAIT TRAINING THERAPY: CPT | Mod: CQ

## 2022-07-01 PROCEDURE — 11000001 HC ACUTE MED/SURG PRIVATE ROOM

## 2022-07-01 PROCEDURE — 87077 CULTURE AEROBIC IDENTIFY: CPT | Performed by: STUDENT IN AN ORGANIZED HEALTH CARE EDUCATION/TRAINING PROGRAM

## 2022-07-01 RX ORDER — DOCUSATE SODIUM 100 MG/1
100 CAPSULE, LIQUID FILLED ORAL DAILY
Status: DISCONTINUED | OUTPATIENT
Start: 2022-07-01 | End: 2022-07-03 | Stop reason: HOSPADM

## 2022-07-01 RX ADMIN — METOPROLOL SUCCINATE 12.5 MG: 25 TABLET, EXTENDED RELEASE ORAL at 09:07

## 2022-07-01 RX ADMIN — PREDNISONE 5 MG: 5 TABLET ORAL at 09:07

## 2022-07-01 RX ADMIN — CHOLECALCIFEROL TAB 25 MCG (1000 UNIT) 1000 UNITS: 25 TAB at 09:07

## 2022-07-01 RX ADMIN — POTASSIUM BICARBONATE 25 MEQ: 977.5 TABLET, EFFERVESCENT ORAL at 09:07

## 2022-07-01 RX ADMIN — ATORVASTATIN CALCIUM 20 MG: 10 TABLET, FILM COATED ORAL at 09:07

## 2022-07-01 RX ADMIN — Medication 1 EACH: at 09:07

## 2022-07-01 RX ADMIN — DIGOXIN 0.25 MG: 125 TABLET ORAL at 09:07

## 2022-07-01 RX ADMIN — ISOSORBIDE MONONITRATE 30 MG: 30 TABLET, EXTENDED RELEASE ORAL at 09:07

## 2022-07-01 RX ADMIN — CHOLESTYRAMINE 4 G: 4 POWDER, FOR SUSPENSION ORAL at 09:07

## 2022-07-01 RX ADMIN — LEVOTHYROXINE SODIUM 100 MCG: 100 TABLET ORAL at 04:07

## 2022-07-01 RX ADMIN — PANTOPRAZOLE SODIUM 40 MG: 40 TABLET, DELAYED RELEASE ORAL at 09:07

## 2022-07-01 RX ADMIN — ASPIRIN 325 MG ORAL TABLET 325 MG: 325 PILL ORAL at 09:07

## 2022-07-01 RX ADMIN — FLUTICASONE PROPIONATE 50 MCG: 50 SPRAY, METERED NASAL at 09:07

## 2022-07-01 RX ADMIN — SODIUM CHLORIDE: 9 INJECTION, SOLUTION INTRAVENOUS at 04:07

## 2022-07-01 NOTE — PROGRESS NOTES
[facility] - 5th Floor Med Surg  Adult Nutrition  Progress Note    SUMMARY       Recommendations    Recommendation/Intervention:   1. Continue regular diet as ordered. Encouraged continued increased po intake as tolerated.   2. Discontinue Boost, pt not drinking.  3. Continue questran and medical management of diarrhea.    4. May benefit from appetite stimulant if inadequate po intake persists.  5. Replete electrolytes as feasible.       Assessment and Plan   Nutrition Problem  Malnutrition    Related to (etiology):   Inadequate energy intake    Signs and Symptoms (as evidenced by):   <75% EER x1 month, >20% wt loss x1 year, physical evidence of muscle and fat loss    Interventions(treatment strategy):  General / Healthful Diet, Commercial beverage, Prescription Medication and Collaboration with other providers    Nutrition Diagnosis Status:   Continues    Goals: 1. Meet >75% nutritional needs by follow-up. 2. Maintain wt throughout hospitalization.  Nutrition Goal Status: progressing to goal      Malnutrition Assessment  Malnutrition in the context of chronic illness    Degree of Malnutrition:  Severe Malnutrition  Energy Intake:  < 75% of estimated energy requirement for >/= 1 month  Interpretation of Weight Loss:  >20% in 1 year  Body Fat: mild depletion  Area of Body Fat Loss:  orbital region  and upper arm region - triceps / biceps  Muscle Mass Loss:  severe depletion  Area of Muscle Mass Loss: temple region - temporalis muscle and clavicle bone region - pectoralis major, deltoid, trapezius muscles  Fluid Accumulation:  mild  Edema: 1+ edema - trace and 2+ edema - mild  Reduced  Strength:  not applicable    A minimum of two characteristics is recommended for diagnosis of either severe or non-severe malnutrition.          Reason for Assessment    Reason For Assessment: identified at risk by screening criteria  Diagnosis:    Visual Hallucinations    Recent Ecoli UTI    HTN  Relevant Medical  "History: CAD, HTN, thyroid disease    General Information Comments:  : Pt seen for MST score. She does not eat much, small bites of meals and states since radiation (?) food tastes different. Reports 100lb wt loss x1 year. Pt takes questran with meals, continues with complaints of "food going straight through" her at times. Daughter wants to add oral supplement for pt to try, will add and monitor tolerance. May benefit from appetite stimulant if inadequate po intake persists.    : Sitting in chair, states appetite improving. Ate 100% baked fish and some of green beans for lunch. Has not tried oral supplement, afraid will worsen her diarrhea. Will discontinue, has multiple at bedside.     Nutrition Risk Screen    Nutrition Risk Screen: no indicators present    Nutrition/Diet History    Spiritual, Cultural Beliefs, Episcopalian Practices, Values that Affect Care: no  Factors Affecting Nutritional Intake: diarrhea, abdominal pain    Anthropometrics    Temp: 98.1 °F (36.7 °C)  Height Method: Stated  Height: 5' 4.02" (162.6 cm)  Height (inches): 64.02 in  Weight Method: Stated  Weight: 63.5 kg (140 lb)  Weight (lb): 140 lb  Ideal Body Weight (IBW), Female: 120.1 lb  % Ideal Body Weight, Female (lb): 116.57 %  BMI (Calculated): 24  Usual Body Weight (UBW), k.9 kg  Weight Change Amount: 115 lb (reported x1 year)  % Usual Body Weight: 54.91  % Weight Change From Usual Weight: -45.21 %       Lab/Procedures/Meds    Pertinent Labs Reviewed: reviewed  Pertinent Labs Comments:   : H/H 11/33.8L, Gluc 59L  : H/H 9.7/L, BUN 34.2/2.82H, GFR 21L, Gluc 78L    Pertinent Medications Reviewed: reviewed  Pertinent Medications Comments: cholestyramine, docusate sodium, ferrous sulfate, levothyroxine, pantoprazole, KCl, prednisone, Vit D , 0.9%NaCl @ 75ml/hr     Estimated/Assessed Needs    Weight Used For Calorie Calculations: 63.5 kg (139 lb 15.9 oz)  Energy Calorie Requirements (kcal): 1587-1905kcals/d " (25-30kcals/kg)  Energy Need Method: Kcal/kg  Protein Requirements: 76g/d (1.2g/kg)  Weight Used For Protein Calculations: 63.5 kg (139 lb 15.9 oz)  Fluid Requirements (mL): 1587ml fl/d  Estimated Fluid Requirement Method: RDA Method  RDA Method (mL): 1587     Nutrition Prescription Ordered    Current Diet Order: regular    Evaluation of Received Nutrient/Fluid Intake    Energy Calories Required: not meeting needs  Protein Required: not meeting needs  Tolerance: tolerating  % Intake of Estimated Energy Needs: 50 - 75 %  % Meal Intake: 50 - 75 %    Nutrition Risk    Level of Risk/Frequency of Follow-up: moderate     Monitor and Evaluation    Food and Nutrient Intake: energy intake, food and beverage intake  Anthropometric Measurements: weight change  Biochemical Data, Medical Tests and Procedures: glucose/endocrine profile, electrolyte and renal panel     Nutrition Follow-Up    RD Follow-up?: Yes

## 2022-07-01 NOTE — PHYSICIAN QUERY
PT Name: Lila Carmona  MR #: [id number]    DOCUMENTATION CLARIFICATION     CDS/: Josette Bell RN BSN              Contact information:[email or web address]  This form is a permanent document in the medical record.     Query Date: July 1, 2022    By submitting this query, we are merely seeking further clarification of documentation. Please utilize your independent clinical judgment when addressing the question(s) below.    The Medical Record contains the following:   Indicators   Supporting Clinical Findings Location in Medical Record   x AMS, Confusion,  LOC, etc.  Lila Carmona is a 67 y.o. female who  has a past medical history of Coronary artery disease, Hypertension, and Thyroid disease.. The patient presented to [facility] on 6/23/2022 with a primary complaint of confusion and hallucinations.  Pt. Sustained a fall 2-3 weeks ago and was seen by md started on gabapentin but stopped after 3d because started having hallucinations.  Confusion hallucinations have persisted and she sustained another fall 1week ago in kitchen.  She has become more difficult to care for requiring 24h care, poor appetite/intake.  Upon my eval pt answers questions appropriately but daughter says she still occasionally hallucinates thinking a baby was crawling on the floor.  Otherwise workup shows mild arnaldo, she complained of neck pain from fall-ct neck and head no acute issues.  MRI  Head has been ordered.    Acute encephalopathy with hallucinations-gabapentin related versus metabolic    Hospital Course:   Patient was seen with her daughters at the bedside.  She had had a good night and still having some hallucinations but improved.  Upon further discussion, she has had these memory problems and hallucinations for quite some time which was exacerbated by the gabapentin.  They have had suspicions of dementia for quite some time.  6/24/22 H&P                          6/25/22 Progress Note      6/26/22 Discharge Summary   x  Acute/Chronic Illness Acute kidney injury  Acute bacterial UTI secondary to Gram-negative rods  Sinus bradycardia  Essential HTN  HLD  History of CAD  Hypothyroidism 6/25/22 Progress Note   x Radiology Findings  No acute intracranial findings.. 6/24/22 MRI Brain   x Electrolyte Imbalance Potassium 3.2 6/23/22 Lab   x Medication IV Rocephin 1gm every 12hrs 6/24 MAR    Treatment              Other       The noted clinical guidelines are only system guidelines and do not replace the providers clinical judgment.    The National [place] of Neurologic Disorders and Stroke (NINDS) of the NIH describes encephalopathy as any diffuse disease of the brain that alters brain function or structure.    Provider, please specify the diagnosis or diagnoses associated with above clinical findings.  [ x  ] Metabolic Encephalopathy - Due to electrolyte imbalance, metabolic derangements, or infectious processes, includes Septic Encephalopathy, Uremic Encephalopathy   [   ] Toxic Encephalopathy - Due to drugs, chemicals, or other toxic substances   [   ] Encephalopathy, unspecified      [   ] Other (please specify): ____________________   [   ] Encephalopathy ruled out   [   ]  Clinically Undetermined         Please document in your progress notes daily for the duration of treatment until resolved, and include in your discharge summary.    References:  LUDY Schultz RN, CCDS. (2018, June 9). Notes from the Instructor: Encephalopathy tips. Retrieved October 22, 2020, from [email or web address]    ICD-9-CM Coding Clinic First Quarter 2013, Effective with discharges: October 21, 2013 [facility] § Seizure with encephalopathy due to postictal state (2013).    ICD-10-CM/[employer] Integrated Codebook (Version V.20.8.10.0) [Computer software]. (2020). Retrieved October 21, 2020.    National [place] of Neurological Disorders and Stroke. (2019, March 27). Retrieved October 22, 2020, from  https://www.ninds.nih.gov/Disorders/All-Disorders/Kifhdanmmjokcn-Aocphzklcep-Pdmv    Form No. 20235

## 2022-07-01 NOTE — PT/OT/SLP PROGRESS
Physical Therapy  Treatment    Lila Carmona   MRN: [id number]   Admitting Diagnosis: Hallucinations       PT Start Time: 1000     PT Stop Time: 1023    PT Total Time (min): 23 min       Billable Minutes:  Gait Training 12 and Therapeutic Activity 11    Treatment Type: Treatment  PT/PTA: PTA     PTA Visit Number: 3       General Precautions: Standard, fall  Orthopedic Precautions: N/A   Braces:    Respiratory Status: Room air    Spiritual, Cultural Beliefs, Uatsdin Practices, Values that Affect Care: no    Subjective:  Communicated with NSG prior to session.           Objective:        Functional Mobility:  Bed Mobility: Mod I         Transfers: Sit to/from stand. Commode t/f. Mod I       Gait:    Pt ambulated ~500ft with straight cane. Mod I. Good gait speed and hellen. No unsteadiness or LOB. Some lateral swaying noted at times but no overt LOB.     Stairs:  Pt ascended/descended 3 stair(s) with straight cane and railing on the R with Mod I        AM-PAC 6 CLICK MOBILITY  How much help from another person does this patient currently need?   1 = Unable, Total/Dependent Assistance  2 = A lot, Maximum/Moderate Assistance  3 = A little, Minimum/Contact Guard/Supervision  4 = None, Modified [place]/Independent         AM-PAC Raw Score CMS G-Code Modifier Level of Impairment Assistance   6 % Total / Unable   7 - 9 CM 80 - 100% Maximal Assist   10 - 14 CL 60 - 80% Moderate Assist   15 - 19 CK 40 - 60% Moderate Assist   20 - 22 CJ 20 - 40% Minimal Assist   23 CI 1-20% SBA / CGA   24 CH 0% Independent/ Mod I     Patient left up in chair with all lines intact, call button in reach and family present.    Assessment:  Lila Carmona is a 67 y.o. female with a medical diagnosis of Hallucinations.    Rehab identified problem list/impairments: Rehab identified problem list/impairments: weakness, impaired endurance, gait instability    Rehab potential is excellent.    Activity tolerance: Excellent    Discharge  recommendations:   Home with family    Barriers to discharge:      Equipment recommendations:       GOALS:   Multidisciplinary Problems     Physical Therapy Goals        Problem: Physical Therapy    Goal Priority Disciplines Outcome Goal Variances Interventions   Physical Therapy Goal     PT, PT/OT Ongoing, Progressing     Description: Goals to be met by: 22     Patient will increase functional independence with mobility by performin. Sit to stand transfer with Modified Nell[place]  2. Gait  x 200 feet with Modified Nellis using Rolling Walker.                      PLAN:    Patient to be seen 5 x/week  to address the above listed problems via gait training, therapeutic activities, therapeutic exercises  Plan of Care expires: 22  Plan of Care reviewed with: patient         2022

## 2022-07-01 NOTE — PROGRESS NOTES
[facility] Hospital Medicine Progress Note        Chief Complaint: Inpatient Follow-up for ARF    HPI:   67 y.o. female who  has a past medical history of Coronary artery disease, Hypertension, and Thyroid disease.. The patient presented to [facility] on 6/27/2022 with a primary complaint of hallucinations.     She was just discharged yesterday from the hospital after admission for UTI and hallucinations. She says the visual hallucinations worsened after she went home. She sees people or animals. She also had right lower quadrant/groin and right lower flank pain. She denies having hallucinations prior to a week ago. Denies hx of depression, anxiety or other psychiatric diagnoses. No family is at bedside for additional history. She reports having mild forgetfulness at times. She denies being officially diagnosed with dementia.    Interval Hx:   Patient today awake and comfortable. Oriented x 3 and following all verbal commands.Ambulating to the restroom. Denies any fever, chills or dysuria. Had a BM and stool occult blood was collected.     Objective/physical exam:  General: In no acute distress, afebrile  Chest: Clear to auscultation bilaterally  Heart: RRR, +S1, S2, no appreciable murmur  Abdomen: Soft, nontender, BS +  MSK: Warm, no lower extremity edema, no clubbing or cyanosis  Neurologic: Alert and oriented x4, Cranial nerve II-XII intact, Strength 5/5 in all 4 extremities    VITAL SIGNS: 24 HRS MIN & MAX LAST   Temp  Min: 97.6 °F (36.4 °C)  Max: 97.8 °F (36.6 °C) 97.7 °F (36.5 °C)   BP  Min: 104/54  Max: 136/72 136/72   Pulse  Min: 45  Max: 54  (!) 51   Resp  Min: 18  Max: 18 18   SpO2  Min: 98 %  Max: 100 % 100 %       Recent Labs   Lab 06/29/22  0608 06/30/22  0357 07/01/22  0343   WBC 12.6* 10.3 7.7   RBC 3.65* 3.47* 3.46*   HGB 10.5* 9.8* 9.7*   HCT 33.1* 31.6* 30.0*   MCV 90.7 91.1 86.7   MCH 28.8 28.2 28.0   MCHC 31.7* 31.0* 32.3*   RDW 16.3 16.2 16.2    237 237   MPV  11.9 12.6* 12.8*       Recent Labs   Lab 06/26/22  0457 06/27/22  0150 06/27/22  0555 06/28/22  0447 06/29/22  0442 06/30/22  0357 07/01/22  0343    141 143   < > 139 138 138   K 3.7 3.7 3.5   < > 3.8 4.5 4.2   CO2 25 26 31   < > 26 27 27   BUN 12.4 9.8 9.9   < > 17.7 28.0* 34.2*   CREATININE 1.08* 1.28* 1.08*   < > 1.82* 2.85* 2.82*   CALCIUM 8.5 10.1 9.8   < > 9.3 8.8 8.6   MG  --   --   --   --  1.50* 2.10  --    ALBUMIN 2.3* 3.3* 2.8*  --   --   --   --    ALKPHOS 44 58 49  --   --   --   --    ALT 7 11 10  --   --   --   --    AST 16 23 19  --   --   --   --    BILITOT 0.5 0.5 0.4  --   --   --   --     < > = values in this interval not displayed.          Microbiology Results (last 7 days)     Procedure Component Value Units Date/Time    Stool Culture [[id number]] Collected: 07/01/22 0930    Order Status: Sent Specimen: Stool Updated: 07/01/22 1000           See below for Radiology    Scheduled Med:   aspirin  325 mg Oral Daily    atorvastatin  20 mg Oral QHS    cholestyramine  1 packet Oral Daily    digoxin  0.25 mg Oral Daily    docusate sodium  100 mg Oral Daily    enoxaparin  40 mg Subcutaneous Daily    ferrous sulfate  1 tablet Oral Daily    fluticasone propionate  1 spray Each Nostril Daily    isosorbide mononitrate  30 mg Oral Daily    levothyroxine  100 mcg Oral Before breakfast    metoprolol succinate  12.5 mg Oral Daily    pantoprazole  40 mg Oral Daily    potassium bicarbonate  25 mEq Oral Daily    predniSONE  5 mg Oral Daily    vitamin D  1,000 Units Oral Daily        Continuous Infusions:   sodium chloride 0.9% 75 mL/hr at 06/30/22 2623        PRN Meds:  acetaminophen, albuterol, sodium chloride 0.9%, sodium chloride 0.9%       Assessment/Plan:  Visual Hallucinations, prob from steroid use. Resolved   ARF  Recently coli UTI status post treatment  Hypertension-controlled  H/O Gout     Plan:  Patient clinically doing well. Ambulating in the room and participating with PTx  Renal  functions still elevated. Will cont IV fluids   Holding Diuretics and ACEI for now   Avoid NSAIDs and other nephrotoxic agents   Monitor H&H . F/U on stool occult blood. If positive will consider a GI consult   Cont supportive care     Dc lovenox and start on SCDs at bedtime. Patient moving well.     BMP in am         VTE prophylaxis: SCDs    Patient condition:  Fair    Anticipated discharge and Disposition:   Home, will need HH. Dc when renal functions improve.       All diagnosis and differential diagnosis have been reviewed; assessment and plan has been documented; I have personally reviewed the labs and test results that are presently available; I have reviewed the patients medication list; I have reviewed the consulting providers response and recommendations. I have reviewed or attempted to review medical records based upon their availability    All of the patient's questions have been  addressed and answered. Patient's is agreeable to the above stated plan. I will continue to monitor closely and make adjustments to medical management as needed.  _____________________________________________________________________    Nutrition Status:    Radiology:  CT Head Without Contrast  Narrative: EXAMINATION:  CT HEAD WITHOUT CONTRAST    CLINICAL HISTORY:  Mental status change, unknown cause;    TECHNIQUE:  Low dose axial CT images obtained throughout the head without intravenous contrast.  Axial, sagittal and coronal reconstructions were performed and interpreted.    DLP: 1051 mGycm    All CT scans at this location are performed using dose optimization techniques as appropriate to a performed exam including the following automated exposure control, adjustment of the mA and/or kV according to patient size and/or use of iterative reconstruction technique    COMPARISON:  MRI brain 06/24/2022    FINDINGS:  BRAIN: Gray white differentiation is maintained. Mild cerebral atrophy.  No hemorrhage. No edema. No mass effect or  midline shift.  The posterior fossa and midline structures are unremarkable.  Atherosclerotic calcifications at the cavernous carotid arteries.    VENTRICLES: Ex vacuo dilatation of the ventricles.    EXTRA-AXIAL: No abnormal extra-axial collections.    BONES: Calvarium is intact.    SINUSES: Visualized paranasal sinuses and mastoid air cells are clear.  Impression: No appreciable acute intracranial abnormality.    Electronically signed by: Betsey Cross  Date:    06/28/2022  Time:    18:24      Vinayak Borrego MD   07/01/2022

## 2022-07-02 LAB
ALBUMIN SERPL-MCNC: 2.7 GM/DL (ref 3.4–4.8)
ALBUMIN/GLOB SERPL: 0.7 RATIO (ref 1.1–2)
ALP SERPL-CCNC: 47 UNIT/L (ref 40–150)
ALT SERPL-CCNC: 10 UNIT/L (ref 0–55)
AST SERPL-CCNC: 15 UNIT/L (ref 5–34)
BASOPHILS # BLD AUTO: 0.05 X10(3)/MCL (ref 0–0.2)
BASOPHILS NFR BLD AUTO: 0.7 %
BILIRUBIN DIRECT+TOT PNL SERPL-MCNC: 0.3 MG/DL
BUN SERPL-MCNC: 24.6 MG/DL (ref 9.8–20.1)
CALCIUM SERPL-MCNC: 9.6 MG/DL (ref 8.4–10.2)
CHLORIDE SERPL-SCNC: 109 MMOL/L (ref 98–107)
CO2 SERPL-SCNC: 27 MMOL/L (ref 23–31)
CREAT SERPL-MCNC: 1.87 MG/DL (ref 0.55–1.02)
EOSINOPHIL # BLD AUTO: 0.13 X10(3)/MCL (ref 0–0.9)
EOSINOPHIL NFR BLD AUTO: 1.9 %
ERYTHROCYTE [DISTWIDTH] IN BLOOD BY AUTOMATED COUNT: 16.2 % (ref 11.5–17)
GLOBULIN SER-MCNC: 3.7 GM/DL (ref 2.4–3.5)
GLUCOSE SERPL-MCNC: 94 MG/DL (ref 82–115)
HCT VFR BLD AUTO: 26.3 % (ref 37–47)
HEMOCCULT SP1 STL QL: NEGATIVE
HGB BLD-MCNC: 8.3 GM/DL (ref 12–16)
IMM GRANULOCYTES # BLD AUTO: 0.08 X10(3)/MCL (ref 0–0.04)
IMM GRANULOCYTES NFR BLD AUTO: 1.1 %
LYMPHOCYTES # BLD AUTO: 1.38 X10(3)/MCL (ref 0.6–4.6)
LYMPHOCYTES NFR BLD AUTO: 19.7 %
MCH RBC QN AUTO: 27.8 PG (ref 27–31)
MCHC RBC AUTO-ENTMCNC: 31.6 MG/DL (ref 33–36)
MCV RBC AUTO: 88 FL (ref 80–94)
MONOCYTES # BLD AUTO: 0.59 X10(3)/MCL (ref 0.1–1.3)
MONOCYTES NFR BLD AUTO: 8.4 %
NEUTROPHILS # BLD AUTO: 4.8 X10(3)/MCL (ref 2.1–9.2)
NEUTROPHILS NFR BLD AUTO: 68.2 %
NRBC BLD AUTO-RTO: 0 %
PLATELET # BLD AUTO: 210 X10(3)/MCL (ref 130–400)
PMV BLD AUTO: 12.5 FL (ref 7.4–10.4)
POTASSIUM SERPL-SCNC: 4.3 MMOL/L (ref 3.5–5.1)
PROT SERPL-MCNC: 6.4 GM/DL (ref 5.8–7.6)
RBC # BLD AUTO: 2.99 X10(6)/MCL (ref 4.2–5.4)
SODIUM SERPL-SCNC: 143 MMOL/L (ref 136–145)
WBC # SPEC AUTO: 7 X10(3)/MCL (ref 4.5–11.5)

## 2022-07-02 PROCEDURE — 25000003 PHARM REV CODE 250: Performed by: INTERNAL MEDICINE

## 2022-07-02 PROCEDURE — 80053 COMPREHEN METABOLIC PANEL: CPT | Performed by: INTERNAL MEDICINE

## 2022-07-02 PROCEDURE — 63600175 PHARM REV CODE 636 W HCPCS: Performed by: INTERNAL MEDICINE

## 2022-07-02 PROCEDURE — 36415 COLL VENOUS BLD VENIPUNCTURE: CPT | Performed by: INTERNAL MEDICINE

## 2022-07-02 PROCEDURE — 11000001 HC ACUTE MED/SURG PRIVATE ROOM

## 2022-07-02 PROCEDURE — 25000003 PHARM REV CODE 250: Performed by: STUDENT IN AN ORGANIZED HEALTH CARE EDUCATION/TRAINING PROGRAM

## 2022-07-02 PROCEDURE — 63600175 PHARM REV CODE 636 W HCPCS: Performed by: NURSE PRACTITIONER

## 2022-07-02 PROCEDURE — 25000003 PHARM REV CODE 250: Performed by: NURSE PRACTITIONER

## 2022-07-02 PROCEDURE — 85025 COMPLETE CBC W/AUTO DIFF WBC: CPT | Performed by: INTERNAL MEDICINE

## 2022-07-02 RX ORDER — FOLIC ACID 1 MG/1
1 TABLET ORAL DAILY
Status: DISCONTINUED | OUTPATIENT
Start: 2022-07-03 | End: 2022-07-03 | Stop reason: HOSPADM

## 2022-07-02 RX ADMIN — FLUTICASONE PROPIONATE 50 MCG: 50 SPRAY, METERED NASAL at 09:07

## 2022-07-02 RX ADMIN — SODIUM CHLORIDE 125 MG: 9 INJECTION, SOLUTION INTRAVENOUS at 05:07

## 2022-07-02 RX ADMIN — PANTOPRAZOLE SODIUM 40 MG: 40 TABLET, DELAYED RELEASE ORAL at 08:07

## 2022-07-02 RX ADMIN — METOPROLOL SUCCINATE 12.5 MG: 25 TABLET, EXTENDED RELEASE ORAL at 09:07

## 2022-07-02 RX ADMIN — CHOLESTYRAMINE 4 G: 4 POWDER, FOR SUSPENSION ORAL at 08:07

## 2022-07-02 RX ADMIN — ATORVASTATIN CALCIUM 20 MG: 10 TABLET, FILM COATED ORAL at 08:07

## 2022-07-02 RX ADMIN — DOCUSATE SODIUM 100 MG: 100 CAPSULE, LIQUID FILLED ORAL at 08:07

## 2022-07-02 RX ADMIN — SODIUM CHLORIDE: 9 INJECTION, SOLUTION INTRAVENOUS at 08:07

## 2022-07-02 RX ADMIN — Medication 1 EACH: at 08:07

## 2022-07-02 RX ADMIN — ISOSORBIDE MONONITRATE 30 MG: 30 TABLET, EXTENDED RELEASE ORAL at 08:07

## 2022-07-02 RX ADMIN — SODIUM CHLORIDE: 9 INJECTION, SOLUTION INTRAVENOUS at 04:07

## 2022-07-02 RX ADMIN — POTASSIUM BICARBONATE 25 MEQ: 977.5 TABLET, EFFERVESCENT ORAL at 08:07

## 2022-07-02 RX ADMIN — PREDNISONE 5 MG: 5 TABLET ORAL at 08:07

## 2022-07-02 RX ADMIN — ASPIRIN 325 MG ORAL TABLET 325 MG: 325 PILL ORAL at 08:07

## 2022-07-02 RX ADMIN — CHOLECALCIFEROL TAB 25 MCG (1000 UNIT) 1000 UNITS: 25 TAB at 08:07

## 2022-07-02 NOTE — PROGRESS NOTES
[facility] Hospital Medicine Progress Note        Chief Complaint: Inpatient Follow-up for ARF    HPI:   67 y.o. female who  has a past medical history of Coronary artery disease, Hypertension, and Thyroid disease.. The patient presented to [facility] on 6/27/2022 with a primary complaint of hallucinations.     She was just discharged yesterday from the hospital after admission for UTI and hallucinations. She says the visual hallucinations worsened after she went home. She sees people or animals. She also had right lower quadrant/groin and right lower flank pain. She denies having hallucinations prior to a week ago. Denies hx of depression, anxiety or other psychiatric diagnoses. No family is at bedside for additional history. She reports having mild forgetfulness at times. She denies being officially diagnosed with dementia.    Interval Hx:   Patient today awake and comfortable. Denies any SOB, chest pain or dizziness. Her H&H is low today and her stool occult blood is positive. Will get GI consult today. Denies abdominal pain.     Objective/physical exam:  General: In no acute distress, afebrile  Chest: Clear to auscultation bilaterally  Heart: RRR, +S1, S2, no appreciable murmur  Abdomen: Soft, nontender, BS +  MSK: Warm, no lower extremity edema, no clubbing or cyanosis  Neurologic: Alert and oriented x4, Cranial nerve II-XII intact, Strength 5/5 in all 4 extremities    VITAL SIGNS: 24 HRS MIN & MAX LAST   Temp  Min: 97.7 °F (36.5 °C)  Max: 98.4 °F (36.9 °C) 98.4 °F (36.9 °C)   BP  Min: 119/54  Max: 149/67 (!) 149/67   Pulse  Min: 44  Max: 59  (!) 45   Resp  Min: 18  Max: 18 18   SpO2  Min: 95 %  Max: 99 % 99 %       Recent Labs   Lab 06/30/22  0357 07/01/22  0343 07/02/22  0549   WBC 10.3 7.7 7.0   RBC 3.47* 3.46* 2.99*   HGB 9.8* 9.7* 8.3*   HCT 31.6* 30.0* 26.3*   MCV 91.1 86.7 88.0   MCH 28.2 28.0 27.8   MCHC 31.0* 32.3* 31.6*   RDW 16.2 16.2 16.2    237 210   MPV 12.6* 12.8*  12.5*       Recent Labs   Lab 06/26/22  0457 06/27/22  0150 06/27/22  0555 06/28/22  0447 06/29/22  0442 06/30/22  0357 07/01/22  0343    141 143   < > 139 138 138   K 3.7 3.7 3.5   < > 3.8 4.5 4.2   CO2 25 26 31   < > 26 27 27   BUN 12.4 9.8 9.9   < > 17.7 28.0* 34.2*   CREATININE 1.08* 1.28* 1.08*   < > 1.82* 2.85* 2.82*   CALCIUM 8.5 10.1 9.8   < > 9.3 8.8 8.6   MG  --   --   --   --  1.50* 2.10  --    ALBUMIN 2.3* 3.3* 2.8*  --   --   --   --    ALKPHOS 44 58 49  --   --   --   --    ALT 7 11 10  --   --   --   --    AST 16 23 19  --   --   --   --    BILITOT 0.5 0.5 0.4  --   --   --   --     < > = values in this interval not displayed.          Microbiology Results (last 7 days)     Procedure Component Value Units Date/Time    Stool Culture [[id number]]  (Normal) Collected: 07/01/22 0930    Order Status: Completed Specimen: Stool Updated: 07/02/22 0837     Stool Culture Negative for Salmonella, Shigella, Campylobacter, Vibrio, Aeromonas, Pleisiomonas,Yersinia, or Shiga Toxin 1 and 2.           See below for Radiology    Scheduled Med:   atorvastatin  20 mg Oral QHS    cholestyramine  1 packet Oral Daily    digoxin  0.25 mg Oral Daily    docusate sodium  100 mg Oral Daily    ferrous sulfate  1 tablet Oral Daily    fluticasone propionate  1 spray Each Nostril Daily    isosorbide mononitrate  30 mg Oral Daily    levothyroxine  100 mcg Oral Before breakfast    metoprolol succinate  12.5 mg Oral Daily    pantoprazole  40 mg Oral Daily    potassium bicarbonate  25 mEq Oral Daily    predniSONE  5 mg Oral Daily    vitamin D  1,000 Units Oral Daily        Continuous Infusions:   sodium chloride 0.9% 75 mL/hr at 07/02/22 0418        PRN Meds:  acetaminophen, albuterol, sodium chloride 0.9%, sodium chloride 0.9%       Assessment/Plan:  Visual Hallucinations, prob from steroid use. Resolved   ARF  Anemia, worse ? GI bleed   Recently coli UTI status post treatment  Hypertension-controlled  H/O Gout      Plan:  Patient clinically doing well  Her H&H is low today and occult blood positive. Suspect GI loss  Stopped ASA and will cont PPI   GI team consulted   CMP pending, f/u on Crt levels   Holding Diuretics and ACEI for now   Avoid NSAIDs and other nephrotoxic agents   Cont supportive care     Cont SCDs at bedtime. Patient moving well.     Labs in am         VTE prophylaxis: SCDs    Patient condition:  Guarded     Anticipated discharge and Disposition:   Home, will need HH. Dc when renal functions improve.       All diagnosis and differential diagnosis have been reviewed; assessment and plan has been documented; I have personally reviewed the labs and test results that are presently available; I have reviewed the patients medication list; I have reviewed the consulting providers response and recommendations. I have reviewed or attempted to review medical records based upon their availability    All of the patient's questions have been  addressed and answered. Patient's is agreeable to the above stated plan. I will continue to monitor closely and make adjustments to medical management as needed.  _____________________________________________________________________    Nutrition Status:    Radiology:      Vinayak Borrego MD   07/02/2022

## 2022-07-02 NOTE — CONSULTS
Gastroenterology Consultation Note    Reason for Consult:  The primary encounter diagnosis was Hallucinations. Diagnoses of Left ankle pain, Right flank pain, Dehydration, and Hypoglycemia were also pertinent to this visit.    PCP:   Delma Washburn MD    Referring MD:  Aaareferral Self  No address on file    Hospital Day: 5     Initial History of Present Illness (HPI):  This is a 67 y.o. female , pt of Dr. Hernandez, past medical history of Coronary artery disease, Hypertension, and Thyroid disease.. The patient presented to [facility] on 6/27/2022 with a primary complaint of hallucinations.     She was just discharged 6-26-22 from the hospital after admission for UTI and hallucinations. She says the visual hallucinations worsened after she went home. She sees people or animals. She also had right lower quadrant/groin and right lower flank pain. She denies having hallucinations prior to a week ago. Denies hx of depression, anxiety or other psychiatric diagnoses. No family is at bedside for additional history. She reports having mild forgetfulness at times. She denies being officially diagnosed with dementia. Pt has had a downward trend of Hgb and we are consulted for anemia.    2018-colon with Dr. Hernandez - per pt - had benign polyps removed    ROS:  Constitutional: Positive for fatigue. Negative for fever.   Respiratory: Negative for cough and shortness of breath.    Cardiovascular: Negative for chest pain and leg swelling.   Gastrointestinal: Negative for abdominal pain.   Genitourinary: Negative for dysuria.   Musculoskeletal: Negative for gait problem.   Psychiatric/Behavioral:. Negative for agitation, behavioral problems, self-injury, sleep disturbance and suicidal ideas. The patient is not nervous/anxious and is not hyperactive.    All other systems reviewed and are negative    Medical History:   Past Medical History:   Diagnosis Date    Cataracts, bilateral     Coronary artery disease     Hypertension      Sarcoidosis, unspecified     Sleep apnea, unspecified     Thyroid disease        Surgical History:   Past Surgical History:   Procedure Laterality Date    CORONARY ARTERY BYPASS GRAFT (CABG)         Family History:   Family History   Problem Relation Age of Onset    Heart disease Mother     Dementia Mother     Heart disease Father     Mental illness Maternal Uncle    .     Social History:   Social History     Tobacco Use    Smoking status: Never Smoker    Smokeless tobacco: Never Used   Substance Use Topics    Alcohol use: Not on file     Comment: occ. dauquar       Allergies:  Review of patient's allergies indicates:   Allergen Reactions    Adhesive tape-silicones     Latex Rash       Medications Prior to Admission   Medication Sig Dispense Refill Last Dose    albuterol (PROVENTIL/VENTOLIN HFA) 90 mcg/actuation inhaler 1 puff as needed       aspirin 325 MG tablet 1 tablet       atorvastatin (LIPITOR) 20 MG tablet 1 tablet       bosentan (TRACLEER) 125 MG Tab 1 Tablet       cholestyramine, with sugar, 4 gram Powd 1 scoop       digoxin (LANOXIN) 250 mcg tablet 1 tablet       ferrous gluconate (FERGON) 324 MG tablet   TAKE 1 TABLET BY MOUTH EVERY DAY       fluticasone propionate (FLONASE) 50 mcg/actuation nasal spray by Nasal route.       hydroCHLOROthiazide (HYDRODIURIL) 25 MG tablet 1 tablet       isosorbide mononitrate (IMDUR) 30 MG 24 hr tablet 1/2 tablet       levothyroxine (SYNTHROID) 100 MCG tablet Take 100 mcg by mouth once daily.       lisinopriL (PRINIVIL,ZESTRIL) 20 MG tablet 1 Tablet       metoprolol succinate (TOPROL-XL) 25 MG 24 hr tablet 1/2 tablet       pantoprazole (PROTONIX) 40 MG tablet 1 tablet       potassium chloride 10% (KAYCIEL) 20 mEq/15 mL oral solution   TAKE 15 ML BY MOUTH DAILY       predniSONE (DELTASONE) 20 MG tablet Take by mouth.       [] sulfamethoxazole-trimethoprim 800-160mg (BACTRIM DS) 800-160 mg Tab Take 1 tablet by mouth 2 (two) times daily.  "for 3 days 6 tablet 0     torsemide (DEMADEX) 20 MG Tab Take 20 mg by mouth once daily.            Objective Findings:    Vital Signs:  BP (!) 161/65   Pulse (!) 46   Temp 97.9 °F (36.6 °C) (Oral)   Resp 18   Ht 5' 4.02" (1.626 m)   Wt 63.5 kg (140 lb)   SpO2 100%   BMI 24.02 kg/m²   Body mass index is 24.02 kg/m².    Physical Exam:  Constitutional: She appears well-developed and well-nourished. No distress.   Eyes: EOM are normal. Pupils are equal, round, and reactive to light.   Cardiovascular: Normal rate and regular rhythm.   Pulmonary/Chest: Breath sounds normal. No respiratory distress.   Abdominal: Abdomen is soft. Bowel sounds are normal.   Musculoskeletal:         General: No tenderness. Normal range of motion.     Neurological: She is alert and oriented to person, place, and time. She has normal strength and normal reflexes. She displays normal reflexes. No cranial nerve deficit or sensory deficit. She displays a negative Romberg sign. Coordination and gait normal. GCS score is 15. GCS eye subscore is 4. GCS verbal subscore is 5. GCS motor subscore is 6.   Normal heel to shin    Skin: Skin is warm. Capillary refill takes less than 2 seconds.   Psychiatric: She has a normal mood and affect. Thought content normal.     Labs:  Recent Results (from the past 48 hour(s))   POCT glucose    Collection Time: 06/30/22  5:33 PM   Result Value Ref Range    POCT Glucose 104 70 - 110 mg/dL   POCT glucose    Collection Time: 06/30/22  8:10 PM   Result Value Ref Range    POCT Glucose 91 70 - 110 mg/dL   Basic Metabolic Panel    Collection Time: 07/01/22  3:43 AM   Result Value Ref Range    Sodium Level 138 136 - 145 mmol/L    Potassium Level 4.2 3.5 - 5.1 mmol/L    Chloride 102 98 - 107 mmol/L    Carbon Dioxide 27 23 - 31 mmol/L    Glucose Level 78 (L) 82 - 115 mg/dL    Blood Urea Nitrogen 34.2 (H) 9.8 - 20.1 mg/dL    Creatinine 2.82 (H) 0.55 - 1.02 mg/dL    BUN/Creatinine Ratio 12     Calcium Level Total 8.6 8.4 " - 10.2 mg/dL    Estimated GFR- 21 mls/min/1.73/m2    Anion Gap 9.0 mEq/L   CBC with Differential    Collection Time: 07/01/22  3:43 AM   Result Value Ref Range    WBC 7.7 4.5 - 11.5 x10(3)/mcL    RBC 3.46 (L) 4.20 - 5.40 x10(6)/mcL    Hgb 9.7 (L) 12.0 - 16.0 gm/dL    Hct 30.0 (L) 37.0 - 47.0 %    MCV 86.7 80.0 - 94.0 fL    MCH 28.0 27.0 - 31.0 pg    MCHC 32.3 (L) 33.0 - 36.0 mg/dL    RDW 16.2 11.5 - 17.0 %    Platelet 237 130 - 400 x10(3)/mcL    MPV 12.8 (H) 7.4 - 10.4 fL    Neut % 73.1 %    Lymph % 17.4 %    Mono % 7.6 %    Eos % 0.9 %    Basophil % 0.4 %    Lymph # 1.34 0.6 - 4.6 x10(3)/mcL    Neut # 5.6 2.1 - 9.2 x10(3)/mcL    Mono # 0.59 0.1 - 1.3 x10(3)/mcL    Eos # 0.07 0 - 0.9 x10(3)/mcL    Baso # 0.03 0 - 0.2 x10(3)/mcL    IG# 0.05 (H) 0 - 0.04 x10(3)/mcL    IG% 0.6 %    NRBC% 0.0 %   POCT glucose    Collection Time: 07/01/22  4:46 AM   Result Value Ref Range    POCT Glucose 83 70 - 110 mg/dL   Stool Culture    Collection Time: 07/01/22  9:30 AM    Specimen: Stool   Result Value Ref Range    Stool Culture       Negative for Salmonella, Shigella, Campylobacter, Vibrio, Aeromonas, Pleisiomonas,Yersinia, or Shiga Toxin 1 and 2.   FECAL LEUKOCYTES - LACTOFERRIN ON  STOOL    Collection Time: 07/01/22  9:30 AM   Result Value Ref Range    Fecal Leukocyte Positive (A) Negative    Leukocyte Positive Control Positive Positive    Leukocyte Negative Control Negative Negative   Occult Blood Stool, CA Screen    Collection Time: 07/01/22  9:35 AM   Result Value Ref Range    Occult Blood Stool 1 Negative Negative   POCT glucose    Collection Time: 07/01/22  1:13 PM   Result Value Ref Range    POCT Glucose 107 70 - 110 mg/dL   POCT glucose    Collection Time: 07/01/22  5:44 PM   Result Value Ref Range    POCT Glucose 95 70 - 110 mg/dL   CBC with Differential    Collection Time: 07/02/22  5:49 AM   Result Value Ref Range    WBC 7.0 4.5 - 11.5 x10(3)/mcL    RBC 2.99 (L) 4.20 - 5.40 x10(6)/mcL    Hgb 8.3 (L)  12.0 - 16.0 gm/dL    Hct 26.3 (L) 37.0 - 47.0 %    MCV 88.0 80.0 - 94.0 fL    MCH 27.8 27.0 - 31.0 pg    MCHC 31.6 (L) 33.0 - 36.0 mg/dL    RDW 16.2 11.5 - 17.0 %    Platelet 210 130 - 400 x10(3)/mcL    MPV 12.5 (H) 7.4 - 10.4 fL    Neut % 68.2 %    Lymph % 19.7 %    Mono % 8.4 %    Eos % 1.9 %    Basophil % 0.7 %    Lymph # 1.38 0.6 - 4.6 x10(3)/mcL    Neut # 4.8 2.1 - 9.2 x10(3)/mcL    Mono # 0.59 0.1 - 1.3 x10(3)/mcL    Eos # 0.13 0 - 0.9 x10(3)/mcL    Baso # 0.05 0 - 0.2 x10(3)/mcL    IG# 0.08 (H) 0 - 0.04 x10(3)/mcL    IG% 1.1 %    NRBC% 0.0 %   Comprehensive Metabolic Panel    Collection Time: 07/02/22 10:04 AM   Result Value Ref Range    Sodium Level 143 136 - 145 mmol/L    Potassium Level 4.3 3.5 - 5.1 mmol/L    Chloride 109 (H) 98 - 107 mmol/L    Carbon Dioxide 27 23 - 31 mmol/L    Glucose Level 94 82 - 115 mg/dL    Blood Urea Nitrogen 24.6 (H) 9.8 - 20.1 mg/dL    Creatinine 1.87 (H) 0.55 - 1.02 mg/dL    Calcium Level Total 9.6 8.4 - 10.2 mg/dL    Protein Total 6.4 5.8 - 7.6 gm/dL    Albumin Level 2.7 (L) 3.4 - 4.8 gm/dL    Globulin 3.7 (H) 2.4 - 3.5 gm/dL    Albumin/Globulin Ratio 0.7 (L) 1.1 - 2.0 ratio    Bilirubin Total 0.3 <=1.5 mg/dL    Alkaline Phosphatase 47 40 - 150 unit/L    Alanine Aminotransferase 10 0 - 55 unit/L    Aspartate Aminotransferase 15 5 - 34 unit/L    Estimated GFR- 34 mls/min/1.73/m2       CT Head Without Contrast   Final Result      No appreciable acute intracranial abnormality.         Electronically signed by: Betsey Cross   Date:    06/28/2022   Time:    18:24      X-Ray Ankle Complete Left   ED Interpretation   No fracture identified.  Significant degenerative changes.      Final Result      Diffuse soft tissue swelling without underlying osseous abnormality.         Electronically signed by: Lan Diamond MD   Date:    06/27/2022   Time:    06:55      CT Abdomen Pelvis  Without Contrast   Final Result      No acute abdominopelvic findings on this noncontrast  scan.      No significant discrepancy with the preliminary report.         Electronically signed by: Marcos Holliday   Date:    06/27/2022   Time:    07:27          Imaging: reviewed    Endoscopy:  2018 with Dr. Hernandez      Assessment/Plan:  1. JOSE of chronic disease    Give iv iron   Start folic acid  Pt sees hematology - Dr. Spring Saldana for chronic anemia - has appointment in August   STOOL IS NEGATIVE FOR FOCB  No indication for endoscopic intervention  2. LUDMILA on CKD  3. Recurrent UTIs      Thank you for allowing us to participate in the care of Lila Carmona.    Latesha Chin NP acting as scribe for Dr. Leonidas Youssef.

## 2022-07-03 VITALS
DIASTOLIC BLOOD PRESSURE: 62 MMHG | SYSTOLIC BLOOD PRESSURE: 143 MMHG | HEART RATE: 50 BPM | OXYGEN SATURATION: 98 % | RESPIRATION RATE: 17 BRPM | TEMPERATURE: 99 F | HEIGHT: 64 IN | BODY MASS INDEX: 23.9 KG/M2 | WEIGHT: 140 LBS

## 2022-07-03 LAB
ANION GAP SERPL CALC-SCNC: 5 MEQ/L
BASOPHILS # BLD AUTO: 0.07 X10(3)/MCL (ref 0–0.2)
BASOPHILS NFR BLD AUTO: 0.9 %
BUN SERPL-MCNC: 17.6 MG/DL (ref 9.8–20.1)
CALCIUM SERPL-MCNC: 9.3 MG/DL (ref 8.4–10.2)
CHLORIDE SERPL-SCNC: 110 MMOL/L (ref 98–107)
CO2 SERPL-SCNC: 28 MMOL/L (ref 23–31)
CREAT SERPL-MCNC: 1.36 MG/DL (ref 0.55–1.02)
CREAT/UREA NIT SERPL: 13
DIGOXIN SERPL-MCNC: 0.6 NG/ML (ref 0.8–2)
EOSINOPHIL # BLD AUTO: 0.09 X10(3)/MCL (ref 0–0.9)
EOSINOPHIL NFR BLD AUTO: 1.2 %
ERYTHROCYTE [DISTWIDTH] IN BLOOD BY AUTOMATED COUNT: 16.1 % (ref 11.5–17)
GLUCOSE SERPL-MCNC: 75 MG/DL (ref 82–115)
HCT VFR BLD AUTO: 31.8 % (ref 37–47)
HGB BLD-MCNC: 10.1 GM/DL (ref 12–16)
IMM GRANULOCYTES # BLD AUTO: 0.12 X10(3)/MCL (ref 0–0.04)
IMM GRANULOCYTES NFR BLD AUTO: 1.5 %
LYMPHOCYTES # BLD AUTO: 1.69 X10(3)/MCL (ref 0.6–4.6)
LYMPHOCYTES NFR BLD AUTO: 21.8 %
MCH RBC QN AUTO: 28.2 PG (ref 27–31)
MCHC RBC AUTO-ENTMCNC: 31.8 MG/DL (ref 33–36)
MCV RBC AUTO: 88.8 FL (ref 80–94)
MONOCYTES # BLD AUTO: 0.56 X10(3)/MCL (ref 0.1–1.3)
MONOCYTES NFR BLD AUTO: 7.2 %
NEUTROPHILS # BLD AUTO: 5.2 X10(3)/MCL (ref 2.1–9.2)
NEUTROPHILS NFR BLD AUTO: 67.4 %
NRBC BLD AUTO-RTO: 0 %
PLATELET # BLD AUTO: 231 X10(3)/MCL (ref 130–400)
PMV BLD AUTO: 12.8 FL (ref 7.4–10.4)
POTASSIUM SERPL-SCNC: 4.3 MMOL/L (ref 3.5–5.1)
RBC # BLD AUTO: 3.58 X10(6)/MCL (ref 4.2–5.4)
SODIUM SERPL-SCNC: 143 MMOL/L (ref 136–145)
WBC # SPEC AUTO: 7.8 X10(3)/MCL (ref 4.5–11.5)

## 2022-07-03 PROCEDURE — 63600175 PHARM REV CODE 636 W HCPCS: Performed by: INTERNAL MEDICINE

## 2022-07-03 PROCEDURE — 25000003 PHARM REV CODE 250: Performed by: INTERNAL MEDICINE

## 2022-07-03 PROCEDURE — 80162 ASSAY OF DIGOXIN TOTAL: CPT | Performed by: INTERNAL MEDICINE

## 2022-07-03 PROCEDURE — 36415 COLL VENOUS BLD VENIPUNCTURE: CPT | Performed by: INTERNAL MEDICINE

## 2022-07-03 PROCEDURE — 85025 COMPLETE CBC W/AUTO DIFF WBC: CPT | Performed by: INTERNAL MEDICINE

## 2022-07-03 PROCEDURE — 80048 BASIC METABOLIC PNL TOTAL CA: CPT | Performed by: INTERNAL MEDICINE

## 2022-07-03 PROCEDURE — 25000003 PHARM REV CODE 250: Performed by: STUDENT IN AN ORGANIZED HEALTH CARE EDUCATION/TRAINING PROGRAM

## 2022-07-03 PROCEDURE — 25000003 PHARM REV CODE 250: Performed by: NURSE PRACTITIONER

## 2022-07-03 RX ADMIN — DOCUSATE SODIUM 100 MG: 100 CAPSULE, LIQUID FILLED ORAL at 08:07

## 2022-07-03 RX ADMIN — PREDNISONE 5 MG: 5 TABLET ORAL at 08:07

## 2022-07-03 RX ADMIN — LEVOTHYROXINE SODIUM 100 MCG: 100 TABLET ORAL at 08:07

## 2022-07-03 RX ADMIN — POTASSIUM BICARBONATE 25 MEQ: 977.5 TABLET, EFFERVESCENT ORAL at 08:07

## 2022-07-03 RX ADMIN — CHOLECALCIFEROL TAB 25 MCG (1000 UNIT) 1000 UNITS: 25 TAB at 08:07

## 2022-07-03 RX ADMIN — METOPROLOL SUCCINATE 12.5 MG: 25 TABLET, EXTENDED RELEASE ORAL at 08:07

## 2022-07-03 RX ADMIN — FOLIC ACID 1 MG: 1 TABLET ORAL at 08:07

## 2022-07-03 RX ADMIN — Medication 1 EACH: at 08:07

## 2022-07-03 RX ADMIN — CHOLESTYRAMINE 4 G: 4 POWDER, FOR SUSPENSION ORAL at 08:07

## 2022-07-03 RX ADMIN — PANTOPRAZOLE SODIUM 40 MG: 40 TABLET, DELAYED RELEASE ORAL at 08:07

## 2022-07-03 RX ADMIN — ISOSORBIDE MONONITRATE 30 MG: 30 TABLET, EXTENDED RELEASE ORAL at 08:07

## 2022-07-03 NOTE — DISCHARGE SUMMARY
[facility] Hospital Medicine Discharge Summary    Admit Date: 6/27/2022  Discharge Date and Time: 7/3/70937:21 AM  Admitting Physician:  service   Discharging Physician: Vinayak Borrego MD.  Primary Care Physician: Delma Washburn MD  Consults: GI     Discharge Diagnoses:  Visual Hallucinations, prob from steroid use. Resolved   ARF: resolved   Anemia, Resolved   Recently coli UTI status post treatment  Hypertension-controlled  H/O Gout   Sinus bradycardia        Hospital Course:   67 y.o. female who  has a past medical history of Coronary artery disease, Hypertension, and Thyroid disease.. The patient presented to [facility] on 6/27/2022 with a primary complaint of hallucinations.     She was just discharged yesterday from the hospital after admission for UTI and hallucinations. She says the visual hallucinations worsened after she went home. She sees people or animals. She also had right lower quadrant/groin and right lower flank pain. She denies having hallucinations prior to a week ago. Denies hx of depression, anxiety or other psychiatric diagnoses. No family is at bedside for additional history. She reports having mild forgetfulness at times. She denies being officially diagnosed with dementia.  Patients home meds was reviewed and she has been on steroids for gout. This was stopped and her mental status improved, She was back at baseline. She also was in ARF and was continued on iv fluids. She was found to have acute on chronic anemia. Rangel was checked and occult blood was negative. (WBC was positive) Seen by GI team and recommended iv Iron therapy. She had sinus bradycardiac. Her home dig and metoprolol was stopped. She denies any chest pain, dizziness or palpitation. She was ambulating well. Her renal functions also improved.   Home steroids was also stopped.   She was advised to have a close f/u with her cardiologist and PCP. HH will also be set up. Will dc to home today  with family.     Pt was seen and examined on the day of discharge  Vitals:  VITAL SIGNS: 24 HRS MIN & MAX LAST   Temp  Min: 97.9 °F (36.6 °C)  Max: 98.4 °F (36.9 °C) 98.4 °F (36.9 °C)   BP  Min: 130/60  Max: 161/65 137/60   Pulse  Min: 44  Max: 56  (!) 56   Resp  Min: 17  Max: 18 17   SpO2  Min: 99 %  Max: 100 % 99 %       Physical Exam:  Heart Sinus bradycardia   Lungs clear   Abdomen soft and non tender   No FND     Procedures Performed: No admission procedures for hospital encounter.     Significant Diagnostic Studies: See Full reports for all details    Recent Labs   Lab 07/01/22  0343 07/02/22  0549 07/03/22  0531   WBC 7.7 7.0 7.8   RBC 3.46* 2.99* 3.58*   HGB 9.7* 8.3* 10.1*   HCT 30.0* 26.3* 31.8*   MCV 86.7 88.0 88.8   MCH 28.0 27.8 28.2   MCHC 32.3* 31.6* 31.8*   RDW 16.2 16.2 16.1    210 231   MPV 12.8* 12.5* 12.8*       Recent Labs   Lab 06/27/22  0150 06/27/22  0555 06/28/22  0447 06/29/22  0442 06/30/22  0357 07/01/22  0343 07/02/22  1004 07/03/22  0531    143   < > 139 138 138 143 143   K 3.7 3.5   < > 3.8 4.5 4.2 4.3 4.3   CO2 26 31   < > 26 27 27 27 28   BUN 9.8 9.9   < > 17.7 28.0* 34.2* 24.6* 17.6   CREATININE 1.28* 1.08*   < > 1.82* 2.85* 2.82* 1.87* 1.36*   CALCIUM 10.1 9.8   < > 9.3 8.8 8.6 9.6 9.3   MG  --   --   --  1.50* 2.10  --   --   --    ALBUMIN 3.3* 2.8*  --   --   --   --  2.7*  --    ALKPHOS 58 49  --   --   --   --  47  --    ALT 11 10  --   --   --   --  10  --    AST 23 19  --   --   --   --  15  --    BILITOT 0.5 0.4  --   --   --   --  0.3  --     < > = values in this interval not displayed.        Microbiology Results (last 7 days)     Procedure Component Value Units Date/Time    Stool Culture [[id number]]  (Normal) Collected: 07/01/22 0930    Order Status: Completed Specimen: Stool Updated: 07/02/22 0837     Stool Culture Negative for Salmonella, Shigella, Campylobacter, Vibrio, Aeromonas, Pleisiomonas,Yersinia, or Shiga Toxin 1 and 2.           CT Head Without  Contrast  Narrative: EXAMINATION:  CT HEAD WITHOUT CONTRAST    CLINICAL HISTORY:  Mental status change, unknown cause;    TECHNIQUE:  Low dose axial CT images obtained throughout the head without intravenous contrast.  Axial, sagittal and coronal reconstructions were performed and interpreted.    DLP: 1051 mGycm    All CT scans at this location are performed using dose optimization techniques as appropriate to a performed exam including the following automated exposure control, adjustment of the mA and/or kV according to patient size and/or use of iterative reconstruction technique    COMPARISON:  MRI brain 06/24/2022    FINDINGS:  BRAIN: Gray white differentiation is maintained. Mild cerebral atrophy.  No hemorrhage. No edema. No mass effect or midline shift.  The posterior fossa and midline structures are unremarkable.  Atherosclerotic calcifications at the cavernous carotid arteries.    VENTRICLES: Ex vacuo dilatation of the ventricles.    EXTRA-AXIAL: No abnormal extra-axial collections.    BONES: Calvarium is intact.    SINUSES: Visualized paranasal sinuses and mastoid air cells are clear.  Impression: No appreciable acute intracranial abnormality.    Electronically signed by: Betsey Cross  Date:    06/28/2022  Time:    18:24           Explained in detail to the patient about the discharge plan, medications, and follow-up visits. Pt understands and agrees with the treatment plan  Discharge Disposition: Left Without Being Seen   Discharged Condition: stable  Diet-   Dietary Orders (From admission, onward)     Start     Ordered    06/27/22 0351  Diet Adult Regular  (Diet/Nutrition [facility])  Diet effective now         06/27/22 0351               Medications Per DC med rec  Activities as tolerated   Follow-up Information     Delma Washburn MD Follow up in 2 week(s).    Specialty: Family Medicine  Contact information:  [address] [facility] [place] [address]  [phone]                       For further questions  contact hospitalist office    Discharge time 33 minutes    For worsening symptoms, chest pain, shortness of breath, increased abdominal pain, high grade fever, stroke or stroke like symptoms, immediately go to the nearest Emergency Room or call 911 as soon as possible.      Vinayak Guerrero M.D, on 7/3/2022. at 9:21 AM.

## 2022-07-05 ENCOUNTER — PATIENT OUTREACH (OUTPATIENT)
Dept: ADMINISTRATIVE | Facility: CLINIC | Age: 67
End: 2022-07-05
Payer: MEDICARE

## 2022-07-05 LAB
BACTERIA STL CULT: NORMAL
METHYLMALONATE SERPL-SCNC: 0.15 NMOL/ML

## 2022-07-05 NOTE — PROGRESS NOTES
C3 nurse spoke with Sushma Brown for a TCC post hospital discharge follow up call. The patient reports does not have a scheduled HOSFU appointment. C3 nurse was unable to schedule HOSFU appointment for [facility] PCP. Patient advised to contact their PCP to schedule a HOSPFU within 7-14 days.

## 2022-07-08 LAB — NIACIN SERPL-MCNC: 3.65 NG/ML

## 2022-07-12 LAB
COLLECT DURATION TIME STL: NORMAL H
FAT/TOTAL SOLIDS STL: 11 % FAT
SPECIMEN WT STL QN: 3 G

## 2024-07-17 DIAGNOSIS — M79.672 LEFT FOOT PAIN: Primary | ICD-10-CM

## 2024-07-17 DIAGNOSIS — M25.561 LATERAL KNEE PAIN, RIGHT: Primary | ICD-10-CM

## 2024-07-31 DIAGNOSIS — F02.B0 MODERATE LATE ONSET ALZHEIMER'S DEMENTIA WITHOUT BEHAVIORAL DISTURBANCE, PSYCHOTIC DISTURBANCE, MOOD DISTURBANCE, OR ANXIETY: ICD-10-CM

## 2024-07-31 DIAGNOSIS — M06.9 RHEUMATOID ARTHRITIS: ICD-10-CM

## 2024-07-31 DIAGNOSIS — G30.1 MODERATE LATE ONSET ALZHEIMER'S DEMENTIA WITHOUT BEHAVIORAL DISTURBANCE, PSYCHOTIC DISTURBANCE, MOOD DISTURBANCE, OR ANXIETY: ICD-10-CM

## 2024-07-31 DIAGNOSIS — E78.2 MIXED HYPERLIPIDEMIA: Primary | ICD-10-CM

## 2024-12-04 ENCOUNTER — OFFICE VISIT (OUTPATIENT)
Dept: NEUROLOGY | Facility: CLINIC | Age: 69
End: 2024-12-04
Payer: MEDICARE

## 2024-12-04 VITALS
HEART RATE: 72 BPM | BODY MASS INDEX: 26.05 KG/M2 | WEIGHT: 147 LBS | HEIGHT: 63 IN | DIASTOLIC BLOOD PRESSURE: 84 MMHG | SYSTOLIC BLOOD PRESSURE: 160 MMHG

## 2024-12-04 DIAGNOSIS — F02.B0 MODERATE LATE ONSET ALZHEIMER'S DEMENTIA WITHOUT BEHAVIORAL DISTURBANCE, PSYCHOTIC DISTURBANCE, MOOD DISTURBANCE, OR ANXIETY: ICD-10-CM

## 2024-12-04 DIAGNOSIS — M06.9 RHEUMATOID ARTHRITIS: ICD-10-CM

## 2024-12-04 DIAGNOSIS — G30.1 MODERATE LATE ONSET ALZHEIMER'S DEMENTIA WITHOUT BEHAVIORAL DISTURBANCE, PSYCHOTIC DISTURBANCE, MOOD DISTURBANCE, OR ANXIETY: ICD-10-CM

## 2024-12-04 DIAGNOSIS — E78.2 MIXED HYPERLIPIDEMIA: ICD-10-CM

## 2024-12-04 PROCEDURE — 99999 PR PBB SHADOW E&M-EST. PATIENT-LVL IV: CPT | Mod: PBBFAC,,, | Performed by: PSYCHIATRY & NEUROLOGY

## 2024-12-04 PROCEDURE — 99214 OFFICE O/P EST MOD 30 MIN: CPT | Mod: PBBFAC | Performed by: PSYCHIATRY & NEUROLOGY

## 2024-12-04 PROCEDURE — 99204 OFFICE O/P NEW MOD 45 MIN: CPT | Mod: S$PBB,,, | Performed by: PSYCHIATRY & NEUROLOGY

## 2024-12-04 RX ORDER — MEMANTINE HYDROCHLORIDE 5 MG/1
TABLET ORAL
Qty: 120 TABLET | Refills: 11 | Status: SHIPPED | OUTPATIENT
Start: 2024-12-04

## 2024-12-04 RX ORDER — FEBUXOSTAT 40 MG/1
40 TABLET, FILM COATED ORAL DAILY
COMMUNITY

## 2024-12-04 RX ORDER — HYDROXYCHLOROQUINE SULFATE 200 MG/1
200 TABLET, FILM COATED ORAL 2 TIMES DAILY
COMMUNITY

## 2024-12-04 NOTE — PROGRESS NOTES
Chief Complaint   Patient presents with    Alzheimer's     NP: Referred by Dr. Kali Washburn for neuro consult to evaluate for Alzheimer's: Daughter states about 2 years ago started forgetting things. States is she is told to do something she forgets what was told. She misplaces objects. Averages 4 - 6 hours at night. Grandson lives with her. Still drives, does not get lost.         This is a 69 y.o. female  here for Alzheimer's disease.  About 2 years ago patient started forgetting things.  She is here with her daughter today who is the collateral source for information.  She often forgets details of conversations.  She misplaces objects frequently.  She still drives and does not have trouble and does not get lost.  She is able to cook and take care of all her ADLs at home.  Her grandson lives with her.  She takes care of her 2-year-old grandson intermittently for a few hours in the morning.  She denies any physical symptoms.    MRI brain microvasc ischemic 7/24        Medication List with Changes/Refills   New Medications    MEMANTINE (NAMENDA) 5 MG TAB    1 PO Q PM for 1 wk, then 1 PO BID for 1 week, then 1 PO Q AM, 2 PO Q PM for 1 wk, then 2 PO BID thereafter   Current Medications    ALBUTEROL (PROVENTIL/VENTOLIN HFA) 90 MCG/ACTUATION INHALER    1 puff as needed    ASPIRIN 325 MG TABLET    Take 325 mg by mouth once daily.    ATORVASTATIN (LIPITOR) 20 MG TABLET    Take 20 mg by mouth every evening.    BOSENTAN (TRACLEER) 125 MG TAB    Take 125 mg by mouth every 12 (twelve) hours.    FEBUXOSTAT (ULORIC) 40 MG TAB    Take 40 mg by mouth once daily.    FERROUS GLUCONATE (FERGON) 324 MG TABLET      TAKE 1 TABLET BY MOUTH EVERY DAY    FLUTICASONE PROPIONATE (FLONASE) 50 MCG/ACTUATION NASAL SPRAY    1 spray by Each Nostril route as needed.    HYDROXYCHLOROQUINE (PLAQUENIL) 200 MG TABLET    Take 200 mg by mouth 2 (two) times daily.    ISOSORBIDE MONONITRATE (IMDUR) 30 MG 24 HR TABLET    Take 15 mg by mouth once daily.     LEVOTHYROXINE (SYNTHROID) 100 MCG TABLET    Take 100 mcg by mouth once daily.    LISINOPRIL (PRINIVIL,ZESTRIL) 20 MG TABLET    Take 2.5 mg by mouth once daily.   Discontinued Medications    CHOLESTYRAMINE, WITH SUGAR, 4 GRAM POWD    1 scoop    DIGOXIN (LANOXIN) 250 MCG TABLET    1 tablet    PANTOPRAZOLE (PROTONIX) 40 MG TABLET    1 tablet    POTASSIUM CHLORIDE 10% (KAYCIEL) 20 MEQ/15 ML ORAL SOLUTION      TAKE 15 ML BY MOUTH DAILY    TORSEMIDE (DEMADEX) 20 MG TAB    Take 20 mg by mouth once daily.        Past Surgical History:   Procedure Laterality Date    CHOLECYSTECTOMY      CORONARY ARTERY BYPASS GRAFT (CABG)      HYSTERECTOMY      THROAT SURGERY      TOTAL KNEE ARTHROPLASTY Bilateral         Past Medical History:   Diagnosis Date    Cataracts, bilateral     Coronary artery disease     Hypertension     Sarcoidosis, unspecified     Sleep apnea, unspecified     Throat cancer     Thyroid disease         Family History   Problem Relation Name Age of Onset    Heart disease Mother      Dementia Mother      Heart disease Father      Cancer Sister      Heart disease Sister      Cancer Brother      Heart disease Brother      Mental illness Maternal Uncle          Social History     Socioeconomic History    Marital status:     Number of children: 5   Tobacco Use    Smoking status: Former     Types: Cigarettes    Smokeless tobacco: Never   Substance and Sexual Activity    Alcohol use: Yes     Comment: occ. dauquari    Drug use: Never          Review of Systems  Review of Systems   Constitutional: Negative for appetite change.   HENT: Negative for sinus pressure and sore throat.    Eyes: Negative for visual disturbance.   Respiratory: Negative for cough and shortness of breath.    Cardiovascular: Negative for chest pain.   Gastrointestinal: Negative for diarrhea and nausea.   Endocrine: Negative for cold intolerance and heat intolerance.   Genitourinary: Negative for dysuria.   Musculoskeletal: Negative for  arthralgias and myalgias.   Skin: Negative for rash.   Allergic/Immunologic: Negative for immunocompromised state.   Neurological:        See HPI   Hematological: Does not bruise/bleed easily.   Psychiatric/Behavioral: Negative for hallucinations.      General: alert and oriented, no acute distress, no audible wheezes, pulse intact, no edema    Vitals:    12/04/24 0921   BP: (!) 160/84   Pulse: 72        Cognition and Comprehension  Speech and language intact  Follows commands  Speech fluent  Attention intact  Memory for recent events intact from history taking  Affect pleasant  Fund of knowledge adequate    Cranial nerves  II. Optic: Visual fields full to confrontation both eyes  III, IV, VI. Oculomotor: Intact, Pupils equal, round and reactive to light, no nystagmus  V. Trigeminal: sensation to light touch normal  VII. No facial asymmetry or facial weakness  VIII. Hearing intact to spoken voice  IX/X. Glossopharyngeal/Vagus: Voice normal, palate rises symmetrically  XI. Axillary: Shoulder shrug normal  XII. Hypoglossal: Intact    Muscle Strength and Tone  Normal upper extremity tone  Normal lower extremity tone  Normal upper extremity strength  Normal lower extremity strength    Sensation  Intact to light touch and temperature    Reflexes  Normal and symmetric    Coordination and Gait  Finger to nose normal  Gait normal       Lila was seen today for alzheimer's.    Diagnoses and all orders for this visit:    Mixed hyperlipidemia  -     Ambulatory referral/consult to Neurology    Rheumatoid arthritis  -     Ambulatory referral/consult to Neurology    Moderate late onset Alzheimer's dementia without behavioral disturbance, psychotic disturbance, mood disturbance, or anxiety  -     Ambulatory referral/consult to Neurology  -     Vitamin B12; Future  -     memantine (NAMENDA) 5 MG Tab; 1 PO Q PM for 1 wk, then 1 PO BID for 1 week, then 1 PO Q AM, 2 PO Q PM for 1 wk, then 2 PO BID thereafter     Patient has irritable  bowel so we will start Namenda.  Check B12.

## 2024-12-13 ENCOUNTER — TELEPHONE (OUTPATIENT)
Dept: NEUROLOGY | Facility: CLINIC | Age: 69
End: 2024-12-13
Payer: MEDICARE

## 2024-12-13 NOTE — TELEPHONE ENCOUNTER
Pt's daughter informed labs reviewed B12 level was relatively low recommend to start OTC supplement of B12 2000 mcg daily

## 2024-12-13 NOTE — TELEPHONE ENCOUNTER
----- Message from Rosetta Matute MD sent at 12/11/2024  9:03 AM CST -----  Vitamin B12 is relatively low. We would recommend that you supplement with oral vitamin B12 2000 mcg daily. This can be found over the counter.

## 2025-06-11 ENCOUNTER — OFFICE VISIT (OUTPATIENT)
Dept: NEUROLOGY | Facility: CLINIC | Age: 70
End: 2025-06-11
Payer: MEDICARE

## 2025-06-11 VITALS
SYSTOLIC BLOOD PRESSURE: 138 MMHG | HEIGHT: 63 IN | DIASTOLIC BLOOD PRESSURE: 78 MMHG | BODY MASS INDEX: 26.22 KG/M2 | HEART RATE: 76 BPM | WEIGHT: 148 LBS

## 2025-06-11 DIAGNOSIS — F02.B0 MODERATE LATE ONSET ALZHEIMER'S DEMENTIA WITHOUT BEHAVIORAL DISTURBANCE, PSYCHOTIC DISTURBANCE, MOOD DISTURBANCE, OR ANXIETY: Primary | ICD-10-CM

## 2025-06-11 DIAGNOSIS — G30.1 MODERATE LATE ONSET ALZHEIMER'S DEMENTIA WITHOUT BEHAVIORAL DISTURBANCE, PSYCHOTIC DISTURBANCE, MOOD DISTURBANCE, OR ANXIETY: Primary | ICD-10-CM

## 2025-06-11 PROCEDURE — 99213 OFFICE O/P EST LOW 20 MIN: CPT | Mod: PBBFAC | Performed by: NURSE PRACTITIONER

## 2025-06-11 PROCEDURE — 99999 PR PBB SHADOW E&M-EST. PATIENT-LVL III: CPT | Mod: PBBFAC,,, | Performed by: NURSE PRACTITIONER

## 2025-06-11 RX ORDER — MEMANTINE HYDROCHLORIDE 5 MG/1
5 TABLET ORAL 2 TIMES DAILY
Qty: 180 TABLET | Refills: 3 | Status: SHIPPED | OUTPATIENT
Start: 2025-06-11

## 2025-06-11 RX ORDER — MEMANTINE HYDROCHLORIDE 5 MG/1
10 TABLET ORAL 2 TIMES DAILY
COMMUNITY
End: 2025-06-11 | Stop reason: SDUPTHER

## 2025-06-11 RX ORDER — ALENDRONATE SODIUM 70 MG/1
70 TABLET ORAL
COMMUNITY
Start: 2025-06-10

## 2025-06-11 NOTE — PROGRESS NOTES
Subjective:       Patient ID: Lila Carmona is a 70 y.o. female.    Chief Complaint: Alzhemier's (Daughter states in her opinion her mother is having some increase forgetfulness. She misplaces objects. Averages 6 - 7 hours of sleep at night. Family stays with her at night. Still drives does not get loss. )      History of Present Illness:  Follow-up visit for dementia.  She is here today with her daughter.  Daughter feels like she is more forgetful since starting the memantine after last visit.  She is taking 10 mg b.i.d..  Of note, the daughter tells me that she does have a history of kidney disease and sees a nephrologist.  The most recent creatinine we have on file is from January and was 1.2.  The patient notices lightheadedness and dizziness upon sitting and standing since starting the medication.  Her daughter also notices that her blood pressure is going a little bit higher throughout the day.  Memory issues are still primarily short-term.  She is still independent with all ADLs and actually keeps her grandson for a few hours during the day at times.  No behavioral or mood issues.  Sleeping well at night.  Appetite okay.              Past Medical History:   Diagnosis Date    Cataracts, bilateral     Coronary artery disease     Hypertension     Sarcoidosis, unspecified     Sleep apnea, unspecified     Throat cancer     Thyroid disease        Past Surgical History:   Procedure Laterality Date    CHOLECYSTECTOMY      CORONARY ARTERY BYPASS GRAFT (CABG)      HYSTERECTOMY      THROAT SURGERY      TOTAL KNEE ARTHROPLASTY Bilateral         Family History   Problem Relation Name Age of Onset    Heart disease Mother      Dementia Mother      Heart disease Father      Cancer Sister      Heart disease Sister      Cancer Brother      Heart disease Brother      Mental illness Maternal Uncle          Social History[1]     Encounter Medications[2]   Objective:   /78 (BP Location: Left arm, Patient Position: Sitting)   " Pulse 76   Ht 5' 3" (1.6 m)   Wt 67.1 kg (148 lb)   BMI 26.22 kg/m²        Physical Exam  General:  Alert and oriented  NAD  No overt edema    Cognition and Comprehension:  Speech and language intact  Follows commands    Cranial nerves:   CN 2_ Visual fields (full to confrontation both eyes)  CN 3, 4, 6_ Intact, JACKSON, no nystagmus  CN 7_no face asymmetry; normal eye closure and smile  CN 8_hearing intact to spoken voice  CN 9, 10, 11_voice normal, shoulder shrug ok; deltoids not fatigable     Muscle Strength and Tone:  Normal upper extremity tone  Normal lower extremity tone  Normal upper extremity strength  Normal lower extremity strength    Coordination and Gait:  Coordination and gait are normal   No ataxia with FTN      Assessment & Plan:      1. Moderate late onset Alzheimer's dementia without behavioral disturbance, psychotic disturbance, mood disturbance, or anxiety  Overview:  Initially evaluated December 20, 2024 for short-term memory issues that started 2 years prior to that.  MRI brain Juy 2024 with chronic microvascular ischemia    MOCAs:  12/2024: 17/30    Assessment & Plan:  -reduce namenda to 5 mg BID    Orders:  -     memantine (NAMENDA) 5 MG Tab; Take 1 tablet (5 mg total) by mouth 2 (two) times daily.  Dispense: 180 tablet; Refill: 3          This note was created with the assistance of voice recognition software. There may be transcription errors as a result of using this technology however minimal. Effort has been made to assure accuracy of transcription but any obvious errors or omissions should be clarified with the author of the document.           [1]   Social History  Socioeconomic History    Marital status:     Number of children: 5   Tobacco Use    Smoking status: Former     Types: Cigarettes    Smokeless tobacco: Never   Substance and Sexual Activity    Alcohol use: Yes     Comment: occ. dauquari    Drug use: Never   [2]   Outpatient Encounter Medications as of 6/11/2025 "   Medication Sig Dispense Refill    albuterol (PROVENTIL/VENTOLIN HFA) 90 mcg/actuation inhaler 1 puff every 6 (six) hours as needed for Wheezing.      alendronate (FOSAMAX) 70 MG tablet Take 70 mg by mouth every 7 days.      aspirin 325 MG tablet Take 325 mg by mouth once daily.      atorvastatin (LIPITOR) 20 MG tablet Take 20 mg by mouth every evening.      bosentan (TRACLEER) 125 MG Tab Take 125 mg by mouth every 12 (twelve) hours.      febuxostat (ULORIC) 40 mg Tab Take 40 mg by mouth once daily.      ferrous gluconate (FERGON) 324 MG tablet   TAKE 1 TABLET BY MOUTH EVERY DAY      fluticasone propionate (FLONASE) 50 mcg/actuation nasal spray 1 spray by Each Nostril route as needed.      hydroxychloroquine (PLAQUENIL) 200 mg tablet Take 200 mg by mouth 2 (two) times daily.      isosorbide mononitrate (IMDUR) 30 MG 24 hr tablet Take 15 mg by mouth once daily.      levothyroxine (SYNTHROID) 100 MCG tablet Take 100 mcg by mouth once daily.      lisinopriL (PRINIVIL,ZESTRIL) 20 MG tablet Take 2.5 mg by mouth once daily.      [DISCONTINUED] memantine (NAMENDA) 5 MG Tab 1 PO Q PM for 1 wk, then 1 PO BID for 1 week, then 1 PO Q AM, 2 PO Q PM for 1 wk, then 2 PO BID thereafter 120 tablet 11    [DISCONTINUED] memantine (NAMENDA) 5 MG Tab Take 10 mg by mouth 2 (two) times daily.      memantine (NAMENDA) 5 MG Tab Take 1 tablet (5 mg total) by mouth 2 (two) times daily. 180 tablet 3    [DISCONTINUED] hydroCHLOROthiazide (HYDRODIURIL) 25 MG tablet 1 tablet      [DISCONTINUED] metoprolol succinate (TOPROL-XL) 25 MG 24 hr tablet 1/2 tablet       No facility-administered encounter medications on file as of 6/11/2025.